# Patient Record
Sex: FEMALE | Race: WHITE | NOT HISPANIC OR LATINO | Employment: OTHER | URBAN - METROPOLITAN AREA
[De-identification: names, ages, dates, MRNs, and addresses within clinical notes are randomized per-mention and may not be internally consistent; named-entity substitution may affect disease eponyms.]

---

## 2017-01-10 ENCOUNTER — TRANSCRIBE ORDERS (OUTPATIENT)
Dept: ADMINISTRATIVE | Facility: HOSPITAL | Age: 82
End: 2017-01-10

## 2017-01-10 ENCOUNTER — APPOINTMENT (OUTPATIENT)
Dept: LAB | Facility: MEDICAL CENTER | Age: 82
End: 2017-01-10
Payer: MEDICARE

## 2017-01-10 DIAGNOSIS — I10 UNSPECIFIED ESSENTIAL HYPERTENSION: ICD-10-CM

## 2017-01-10 DIAGNOSIS — I10 UNSPECIFIED ESSENTIAL HYPERTENSION: Primary | ICD-10-CM

## 2017-01-10 LAB
ANION GAP SERPL CALCULATED.3IONS-SCNC: 10 MMOL/L (ref 4–13)
BUN SERPL-MCNC: 20 MG/DL (ref 5–25)
CALCIUM SERPL-MCNC: 9.4 MG/DL (ref 8.3–10.1)
CHLORIDE SERPL-SCNC: 109 MMOL/L (ref 100–108)
CO2 SERPL-SCNC: 26 MMOL/L (ref 21–32)
CREAT SERPL-MCNC: 0.92 MG/DL (ref 0.6–1.3)
GFR SERPL CREATININE-BSD FRML MDRD: 57 ML/MIN/1.73SQ M
GLUCOSE SERPL-MCNC: 77 MG/DL (ref 65–140)
POTASSIUM SERPL-SCNC: 4.2 MMOL/L (ref 3.5–5.3)
SODIUM SERPL-SCNC: 145 MMOL/L (ref 136–145)

## 2017-01-10 PROCEDURE — 80048 BASIC METABOLIC PNL TOTAL CA: CPT

## 2017-01-10 PROCEDURE — 36415 COLL VENOUS BLD VENIPUNCTURE: CPT

## 2017-04-21 ENCOUNTER — HOSPITAL ENCOUNTER (OUTPATIENT)
Dept: RADIOLOGY | Facility: MEDICAL CENTER | Age: 82
Discharge: HOME | End: 2017-04-21
Payer: MEDICARE

## 2017-04-21 ENCOUNTER — TRANSCRIBE ORDERS (OUTPATIENT)
Dept: ADMINISTRATIVE | Facility: HOSPITAL | Age: 82
End: 2017-04-21

## 2017-04-21 DIAGNOSIS — M47.814 THORACIC SPONDYLOSIS WITHOUT MYELOPATHY: Primary | ICD-10-CM

## 2017-04-21 DIAGNOSIS — M47.814 THORACIC SPONDYLOSIS WITHOUT MYELOPATHY: ICD-10-CM

## 2017-04-21 PROCEDURE — 72072 X-RAY EXAM THORAC SPINE 3VWS: CPT

## 2017-12-04 ENCOUNTER — TRANSCRIBE ORDERS (OUTPATIENT)
Dept: ADMINISTRATIVE | Facility: HOSPITAL | Age: 82
End: 2017-12-04

## 2017-12-04 ENCOUNTER — APPOINTMENT (OUTPATIENT)
Dept: RADIOLOGY | Facility: MEDICAL CENTER | Age: 82
End: 2017-12-04
Payer: MEDICARE

## 2017-12-04 ENCOUNTER — GENERIC CONVERSION - ENCOUNTER (OUTPATIENT)
Dept: OTHER | Facility: OTHER | Age: 82
End: 2017-12-04

## 2017-12-04 DIAGNOSIS — M16.11 PRIMARY OSTEOARTHRITIS OF RIGHT HIP: Primary | ICD-10-CM

## 2017-12-04 PROCEDURE — 71101 X-RAY EXAM UNILAT RIBS/CHEST: CPT

## 2017-12-04 PROCEDURE — 73502 X-RAY EXAM HIP UNI 2-3 VIEWS: CPT

## 2018-03-08 ENCOUNTER — TRANSCRIBE ORDERS (OUTPATIENT)
Dept: PHYSICAL THERAPY | Facility: MEDICAL CENTER | Age: 83
End: 2018-03-08

## 2018-03-08 ENCOUNTER — EVALUATION (OUTPATIENT)
Dept: PHYSICAL THERAPY | Facility: MEDICAL CENTER | Age: 83
End: 2018-03-08
Payer: MEDICARE

## 2018-03-08 DIAGNOSIS — M25.552 LEFT HIP PAIN: Primary | ICD-10-CM

## 2018-03-08 PROCEDURE — G8979 MOBILITY GOAL STATUS: HCPCS | Performed by: PHYSICAL THERAPIST

## 2018-03-08 PROCEDURE — G8978 MOBILITY CURRENT STATUS: HCPCS | Performed by: PHYSICAL THERAPIST

## 2018-03-08 PROCEDURE — 97161 PT EVAL LOW COMPLEX 20 MIN: CPT | Performed by: PHYSICAL THERAPIST

## 2018-03-08 PROCEDURE — 97110 THERAPEUTIC EXERCISES: CPT | Performed by: PHYSICAL THERAPIST

## 2018-03-08 NOTE — LETTER
2018    Ana María Reddy DO  [address] [place] [id number]    Patient: Lawrence Houser   YOB: 1923   Date of Visit: 3/8/2018     Encounter Diagnosis     ICD-10-CM    1  Left hip pain M25 552        Dear Dr Karol Ann:    Please review the attached Plan of Care from [facility] recent visit  Please verify that you agree therapy should continue by signing the attached document and sending it back to our office  If you have any questions or concerns, please don't hesitate to call  Sincerely,    Jose George, PT      Referring Provider:      I certify that I have read the below Plan of Care and certify the need for these services furnished under this plan of treatment while under my care  Ana María Reddy DO  [address] [place] [id number]  VIA Facsimile: [phone]          PT Evaluation     Today's date: 3/8/2018  Patient name: Lawrence Houser  : 1923  MRN: [id number]  Referring provider: Carlos Manuel Ross DO  Dx: No diagnosis found  Assessment  Impairments: abnormal or restricted ROM, impaired physical strength, lacks appropriate home exercise program and pain with function    Assessment details: Lawrence Houser is a 80 y o  female who presents with No diagnosis found     Patient presents alert and oriented with the above impairments  Cristi Lang will benefit from PT to addres deficits in order to maximize and return to prior level of function  No further referral appears necessary at this time based upon examination results  Prognosis is good given HEP compliance  Please contact me if you have any questions or recommendations  Understanding of Dx/Px/POC: good   Prognosis: good    Goals  Short Term Goals:   1  Pain decreased 2 ratings in 4 weeks  2  ROM increased 10* in 4 weeks  3  Strength increased 1/2 grade in 4 weeks    Long Term Goals:   1  ADLS/IADLS in related activities improved to maximal level in 8 weeks  2    Sit to stand is improved to maximal level in 8 weeks  3  Recreational activities are improved to maximal level in 8 weeks  4   [place] with HEP in 8 weeks  Plan  Patient would benefit from: skilled PT  Planned modality interventions: cryotherapy  Planned therapy interventions: manual therapy, flexibility, functional ROM exercises, therapeutic exercise, stretching, strengthening, patient education, neuromuscular re-education and home exercise program  Frequency: 2x week  Duration in weeks: 6  Treatment plan discussed with: patient        Subjective Evaluation    History of Present Illness  Mechanism of injury: Pt reports onset of L sided groin pain a little over a month ago  She spoke w/ PCP regarding pain and was referred to physical therapy  She reports that pain is intermittent in nature and 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  She also reports some pain and difficulty w/ sit to stand transfers  She continues to play organ 3-4x/week for about 1 hour each time  Pain worsens as time playing increases  She denies sleep disturbance  She remains independent w/ all cooking, cleaning, driving      Pain  At best pain ratin  At worst pain ratin    Patient Goals  Patient goals for therapy: decreased pain, return to sport/leisure activities and increased motion          Objective     Active Range of Motion   Left Hip   Flexion: 90 degrees   Extension: -12 degrees   Abduction: 15 degrees     Right Hip   Flexion: 95 degrees   Extension: 10 degrees   Abduction: 45 degrees     Strength/Myotome Testing     Left Hip   Planes of Motion   Flexion: 3+  Abduction: 3+    Right Hip   Planes of Motion   Flexion: 4+  Abduction: 4-          Precautions: hypertension    Daily Treatment Diary     Manual                                                                                   Exercise Diary  3/8            Adductor squeeze 5"x10            Hip abd w/ tband RTB 5"x10            SLR nv            marlynhelmargarito hadley Seated marching nv            Hamstring stretch nv            laq nv                                                                                                                                                                                         Modalities

## 2018-03-08 NOTE — PROGRESS NOTES
PT Evaluation     Today's date: 3/8/2018  Patient name: Alix Mckenna  : 1923  MRN: [id number]  Referring provider: Armida Zamorano DO  Dx: No diagnosis found  Assessment  Impairments: abnormal or restricted ROM, impaired physical strength, lacks appropriate home exercise program and pain with function    Assessment details: Alix Mckenna is a 80 y o  female who presents with No diagnosis found     Patient presents alert and oriented with the above impairments  Angela Gaminoimmer will benefit from PT to addres deficits in order to maximize and return to prior level of function  No further referral appears necessary at this time based upon examination results  Prognosis is good given HEP compliance  Please contact me if you have any questions or recommendations  Understanding of Dx/Px/POC: good   Prognosis: good    Goals  Short Term Goals:   1  Pain decreased 2 ratings in 4 weeks  2  ROM increased 10* in 4 weeks  3  Strength increased 1/2 grade in 4 weeks    Long Term Goals:   1  ADLS/IADLS in related activities improved to maximal level in 8 weeks  2  Sit to stand is improved to maximal level in 8 weeks  3  Recreational activities are improved to maximal level in 8 weeks  4   [place] with HEP in 8 weeks  Plan  Patient would benefit from: skilled PT  Planned modality interventions: cryotherapy  Planned therapy interventions: manual therapy, flexibility, functional ROM exercises, therapeutic exercise, stretching, strengthening, patient education, neuromuscular re-education and home exercise program  Frequency: 2x week  Duration in weeks: 6  Treatment plan discussed with: patient        Subjective Evaluation    History of Present Illness  Mechanism of injury: Pt reports onset of L sided groin pain a little over a month ago  She spoke w/ PCP regarding pain and was referred to physical therapy  She reports that pain is intermittent in nature and 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  She also reports some pain and difficulty w/ sit to stand transfers  She continues to play organ 3-4x/week for about 1 hour each time  Pain worsens as time playing increases  She denies sleep disturbance  She remains independent w/ all cooking, cleaning, driving      Pain  At best pain ratin  At worst pain ratin    Patient Goals  Patient goals for therapy: decreased pain, return to sport/leisure activities and increased motion          Objective     Active Range of Motion   Left Hip   Flexion: 90 degrees   Extension: -12 degrees   Abduction: 15 degrees     Right Hip   Flexion: 95 degrees   Extension: 10 degrees   Abduction: 45 degrees     Strength/Myotome Testing     Left Hip   Planes of Motion   Flexion: 3+  Abduction: 3+    Right Hip   Planes of Motion   Flexion: 4+  Abduction: 4-          Precautions: hypertension    Daily Treatment Diary     Manual                                                                                   Exercise Diary  3/8            Adductor squeeze 5"x10            Hip abd w/ tband RTB 5"x10            SLR nv            clamshells nv            Seated marching nv            Hamstring stretch nv            laq nv                                                                                                                                                                                         Modalities

## 2018-03-22 ENCOUNTER — OFFICE VISIT (OUTPATIENT)
Dept: PHYSICAL THERAPY | Facility: MEDICAL CENTER | Age: 83
End: 2018-03-22
Payer: MEDICARE

## 2018-03-22 DIAGNOSIS — M25.552 LEFT HIP PAIN: Primary | ICD-10-CM

## 2018-03-22 PROCEDURE — 97110 THERAPEUTIC EXERCISES: CPT

## 2018-03-22 PROCEDURE — 97112 NEUROMUSCULAR REEDUCATION: CPT

## 2018-03-22 NOTE — PROGRESS NOTES
Daily Note     Today's date: 3/22/2018  Patient name: Yanely Burn  : 1923  MRN: [id number]  Referring provider: Maria D Thompson DO  Dx:   Encounter Diagnosis     ICD-10-CM    1  Left hip pain M25 552                   Subjective: Pt reports having pain today 3/10 in her left hip front and side  Notes that she is doing her home exercises daily with no further questions  Objective: See treatment diary below    Precautions: hypertension     Daily Treatment Diary      Manual                                                                                                                                                      Exercise Diary  3/8  3/22                   Adductor squeeze 5"x10  5"x20                   Hip abd w/ tband RTB 5"x10  5"x10                   SLR nv  2x10 ea                    clamshells nv  2x10                   Seated marching nv  10x ea                   Hamstring stretch nv  30"x3 ea                    laq nv  2x10 ea                     bike    6'                                                                                                                                                                                                                                                                                                                         Modalities                                                                                                           Assessment: Tolerated treatment fair  Patient demonstrated fatigue post treatment and would benefit from continued PT  Performed all TE on both sides today to promote strength  Pt could not tolerate lying on her L side due to pain  She also had pain with resistive abduction and was instructed to stay in a pain free range  Pt was fatigued at end  Ended on the bike today  Plan: Continue per plan of care

## 2018-03-26 ENCOUNTER — OFFICE VISIT (OUTPATIENT)
Dept: PHYSICAL THERAPY | Facility: MEDICAL CENTER | Age: 83
End: 2018-03-26
Payer: MEDICARE

## 2018-03-26 DIAGNOSIS — M25.552 LEFT HIP PAIN: Primary | ICD-10-CM

## 2018-03-26 PROCEDURE — 97110 THERAPEUTIC EXERCISES: CPT | Performed by: PHYSICAL MEDICINE & REHABILITATION

## 2018-03-26 NOTE — PROGRESS NOTES
Daily Note     Today's date: 3/26/2018  Patient name: Surjit Hitchcock  : 1923  MRN: [id number]  Referring provider: Farzad Latif DO  Dx:   Encounter Diagnosis     ICD-10-CM    1  Left hip pain M25 552                   Subjective: Patient presents with continued complaints of left anterior hip/groin pain  Patient requests HEP  Objective: See treatment diary below    Precautions: hypertension     Daily Treatment Diary      Manual                                                                                                                                                      Exercise Diary  3/8  3/22  3/26                 Adductor squeeze 5"x10  5"x20  NP                 Hip abd w/ tband RTB 5"x10  5"x10  NP                 SLR nv  2x10 ea   NP                 clamshells nv  2x10  seated 20x5" RTB                 Seated marching nv  10x ea  1# 10xea                 Hamstring stretch nv  30"x3 ea   3x20" ea seated                 laq nv  2x10 ea   1#  2x10                  bike    6'  8'                                                                                                                                                                                                                                                                                                                       Modalities                                                                                                           Assessment: Tolerated treatment fair  Instructed patient on updated HEP to include TE as charted  All TE performed seated in chair this session  Intermittent cueing for form maintenance and range limitation to avoid pain provocation throughout  Patient demonstrated fatigue post treatment and would benefit from continued PT  Plan: Continue per plan of care

## 2018-04-02 ENCOUNTER — EVALUATION (OUTPATIENT)
Dept: PHYSICAL THERAPY | Facility: MEDICAL CENTER | Age: 83
End: 2018-04-02
Payer: MEDICARE

## 2018-04-02 DIAGNOSIS — M25.552 LEFT HIP PAIN: Primary | ICD-10-CM

## 2018-04-02 PROCEDURE — G8979 MOBILITY GOAL STATUS: HCPCS | Performed by: PHYSICAL THERAPIST

## 2018-04-02 PROCEDURE — 97110 THERAPEUTIC EXERCISES: CPT | Performed by: PHYSICAL THERAPIST

## 2018-04-02 PROCEDURE — G8980 MOBILITY D/C STATUS: HCPCS | Performed by: PHYSICAL THERAPIST

## 2018-04-02 NOTE — PROGRESS NOTES
PT Re-Evaluation  and PT Discharge    Today's date: 2018  Patient name: Emigdio Cedeño  : 1923  MRN: [id number]  Referring provider: Bhavna Hall DO  Dx:   Encounter Diagnosis     ICD-10-CM    1  Left hip pain M25 552                   Assessment  Impairments: impaired physical strength and pain with function    Assessment details: Emigdio Cedeño has attended 4 visits since initiating PT and has demonstrated overall improvement  Mobility and strength has improved with intermittent decreased reports of pain  Emigdio Cedeño has demonstrated an improvement in function as well  Currently, she has made steady progress towards her goals, but continue to remain limited with organ playing   At this time, patient wishes to be discharged and continue w/ hep independently  She was provided w/ updated hep at last visit and reports confidence w/ all exercises  Advised to f/u w/ MD if pain persists and/or worsens  Goals  Goals  Short Term Goals:   1  Pain decreased 2 ratings in 4 weeks - PARTIALLY MET  2   ROM increased 10* in 4 weeks - MET  3  Strength increased 1/2 grade in 4 weeks - MET    Long Term Goals:   1  ADLS/IADLS in related activities improved to maximal level in 8 weeks - MET  2  Sit to stand is improved to maximal level in 8 weeks - PARTIALLY MET  3  Recreational activities are improved to maximal level in 8 weeks  NOT MET  4  [place] with HEP in 8 weeks  - MET    Plan  Plan details: D/c TODAY        Subjective Evaluation    History of Present Illness  Mechanism of injury: Pt reports persistent groin pain w/ playing organ  Also some pain w/ standing following prolonged sitting  No issues w/ standing and walking  Remains independent w/ all household chores    Pain  At best pain ratin  At worst pain ratin    Patient Goals  Patient goals for therapy: decreased pain          Objective     Active Range of Motion   Left Hip   Flexion: 108 degrees   Extension: 3 degrees Abduction: 25 degrees     Strength/Myotome Testing     Left Hip   Planes of Motion   Flexion: 4-  Abduction: 4-      Flowsheet Rows    Flowsheet Row Most Recent Value   PT/OT G-Codes   Current Score  56   Projected Score  61   FOTO information reviewed  Yes   Assessment Type  Discharge   G code set  Mobility: Walking & Moving Around   Mobility: Walking and Moving Around Goal Status ()  CK   Mobility: Walking and Moving Around Discharge Status ()  CK          Precautions: hypertension    Daily Treatment Diary     Manual                                                                                   Exercise Diary  4/2            bike 10'            LAQ 1# 5"x20            Seated marching 1# 5"x20            SLR x20            Hip adductor squeezes 5"x20            Seated hamstring stretch 30"x3                                                                                                                                                                                                      Modalities

## 2018-04-02 NOTE — LETTER
2018    Jennifer Andreas   [address] [place] [address]    Patient: Marlea Sacks   YOB: 1923   Date of Visit: 2018     Encounter Diagnosis     ICD-10-CM    1  Left hip pain M25 552        Dear Dr Don Montanez:    Please review the attached Plan of Care from [facility] recent visit  Please verify that you agree therapy should continue by signing the attached document and sending it back to our office  If you have any questions or concerns, please don't hesitate to call  Sincerely,    Campbell Nick, PT      Referring Provider:      I certify that I have read the below Plan of Care and certify the need for these services furnished under this plan of treatment while under my care  Jennifer DO Andreas  [address]  [facility] [address]  VIA Facsimile: [phone]          PT Re-Evaluation  and PT Discharge    Today's date: 2018  Patient name: Marlea Sacks  : 1923  MRN: [id number]  Referring provider: Earlean Duane, DO  Dx:   Encounter Diagnosis     ICD-10-CM    1  Left hip pain M25 552                   Assessment  Impairments: impaired physical strength and pain with function    Assessment details: Marlea Sacks has attended 4 visits since initiating PT and has demonstrated overall improvement  Mobility and strength has improved with intermittent decreased reports of pain  Marlea Sacks has demonstrated an improvement in function as well  Currently, she has made steady progress towards her goals, but continue to remain limited with organ playing   At this time, patient wishes to be discharged and continue w/ hep independently  She was provided w/ updated hep at last visit and reports confidence w/ all exercises  Advised to f/u w/ MD if pain persists and/or worsens  Goals  Goals  Short Term Goals:   1  Pain decreased 2 ratings in 4 weeks - PARTIALLY MET  2   ROM increased 10* in 4 weeks - MET  3    Strength increased 1/2 grade in 4 weeks - MET    Long Term Goals:   1  ADLS/IADLS in related activities improved to maximal level in 8 weeks - MET  2  Sit to stand is improved to maximal level in 8 weeks - PARTIALLY MET  3  Recreational activities are improved to maximal level in 8 weeks  NOT MET  4  [place] with HEP in 8 weeks  - MET    Plan  Plan details: D/c TODAY        Subjective Evaluation    History of Present Illness  Mechanism of injury: Pt reports persistent groin pain w/ playing organ  Also some pain w/ standing following prolonged sitting  No issues w/ standing and walking  Remains independent w/ all household chores    Pain  At best pain ratin  At worst pain ratin    Patient Goals  Patient goals for therapy: decreased pain          Objective     Active Range of Motion   Left Hip   Flexion: 108 degrees   Extension: 3 degrees   Abduction: 25 degrees     Strength/Myotome Testing     Left Hip   Planes of Motion   Flexion: 4-  Abduction: 4-      Flowsheet Rows    Flowsheet Row Most Recent Value   PT/OT G-Codes   Current Score  56   Projected Score  59   FOTO information reviewed  Yes   Assessment Type  Discharge   G code set  Mobility: Walking & Moving Around   Mobility: Walking and Moving Around Goal Status ()  CK   Mobility: Walking and Moving Around Discharge Status ()  CK          Precautions: hypertension    Daily Treatment Diary     Manual                                                                                   Exercise Diary  /            bike 10'            LAQ 1# 5"x20            Seated marching 1# 5"x20            SLR x20            Hip adductor squeezes 5"x20            Seated hamstring stretch 30"x3                                                                                                                                                                                                      Modalities

## 2018-04-13 ENCOUNTER — TRANSCRIBE ORDERS (OUTPATIENT)
Dept: ADMINISTRATIVE | Facility: HOSPITAL | Age: 83
End: 2018-04-13

## 2018-04-13 DIAGNOSIS — I74.9 EMBOLISM (HCC): Primary | ICD-10-CM

## 2018-04-20 ENCOUNTER — TRANSCRIBE ORDERS (OUTPATIENT)
Dept: ADMINISTRATIVE | Facility: HOSPITAL | Age: 83
End: 2018-04-20

## 2018-04-20 ENCOUNTER — APPOINTMENT (OUTPATIENT)
Dept: LAB | Facility: MEDICAL CENTER | Age: 83
End: 2018-04-20
Payer: MEDICARE

## 2018-04-20 ENCOUNTER — HOSPITAL ENCOUNTER (OUTPATIENT)
Dept: RADIOLOGY | Facility: MEDICAL CENTER | Age: 83
Discharge: HOME | End: 2018-04-20
Payer: MEDICARE

## 2018-04-20 DIAGNOSIS — I74.8 HEPATIC ARTERY THROMBOSIS (HCC): ICD-10-CM

## 2018-04-20 DIAGNOSIS — E78.5 HYPERLIPIDEMIA, UNSPECIFIED HYPERLIPIDEMIA TYPE: ICD-10-CM

## 2018-04-20 DIAGNOSIS — I74.9 EMBOLISM (HCC): ICD-10-CM

## 2018-04-20 DIAGNOSIS — I10 ESSENTIAL HYPERTENSION, BENIGN: Primary | ICD-10-CM

## 2018-04-20 LAB
ALBUMIN SERPL BCP-MCNC: 3.3 G/DL (ref 3.5–5)
ALP SERPL-CCNC: 88 U/L (ref 46–116)
ALT SERPL W P-5'-P-CCNC: 20 U/L (ref 12–78)
ANION GAP SERPL CALCULATED.3IONS-SCNC: 7 MMOL/L (ref 4–13)
AST SERPL W P-5'-P-CCNC: 29 U/L (ref 5–45)
BASOPHILS # BLD AUTO: 0.03 THOUSANDS/ΜL (ref 0–0.1)
BASOPHILS NFR BLD AUTO: 1 % (ref 0–1)
BILIRUB SERPL-MCNC: 0.74 MG/DL (ref 0.2–1)
BUN SERPL-MCNC: 28 MG/DL (ref 5–25)
CALCIUM SERPL-MCNC: 9 MG/DL (ref 8.3–10.1)
CHLORIDE SERPL-SCNC: 111 MMOL/L (ref 100–108)
CHOLEST SERPL-MCNC: 122 MG/DL (ref 50–200)
CO2 SERPL-SCNC: 27 MMOL/L (ref 21–32)
CREAT SERPL-MCNC: 1.03 MG/DL (ref 0.6–1.3)
EOSINOPHIL # BLD AUTO: 0.18 THOUSAND/ΜL (ref 0–0.61)
EOSINOPHIL NFR BLD AUTO: 4 % (ref 0–6)
ERYTHROCYTE [DISTWIDTH] IN BLOOD BY AUTOMATED COUNT: 13.8 % (ref 11.6–15.1)
GFR SERPL CREATININE-BSD FRML MDRD: 47 ML/MIN/1.73SQ M
GLUCOSE P FAST SERPL-MCNC: 92 MG/DL (ref 65–99)
HCT VFR BLD AUTO: 42.2 % (ref 34.8–46.1)
HDLC SERPL-MCNC: 34 MG/DL (ref 40–60)
HGB BLD-MCNC: 14.2 G/DL (ref 11.5–15.4)
LDLC SERPL CALC-MCNC: 65 MG/DL (ref 0–100)
LYMPHOCYTES # BLD AUTO: 1.84 THOUSANDS/ΜL (ref 0.6–4.47)
LYMPHOCYTES NFR BLD AUTO: 38 % (ref 14–44)
MCH RBC QN AUTO: 30 PG (ref 26.8–34.3)
MCHC RBC AUTO-ENTMCNC: 33.6 G/DL (ref 31.4–37.4)
MCV RBC AUTO: 89 FL (ref 82–98)
MONOCYTES # BLD AUTO: 0.78 THOUSAND/ΜL (ref 0.17–1.22)
MONOCYTES NFR BLD AUTO: 16 % (ref 4–12)
NEUTROPHILS # BLD AUTO: 2.06 THOUSANDS/ΜL (ref 1.85–7.62)
NEUTS SEG NFR BLD AUTO: 41 % (ref 43–75)
NONHDLC SERPL-MCNC: 88 MG/DL
NRBC BLD AUTO-RTO: 0 /100 WBCS
PLATELET # BLD AUTO: 143 THOUSANDS/UL (ref 149–390)
PMV BLD AUTO: 11.7 FL (ref 8.9–12.7)
POTASSIUM SERPL-SCNC: 3.9 MMOL/L (ref 3.5–5.3)
PROT SERPL-MCNC: 6.5 G/DL (ref 6.4–8.2)
RBC # BLD AUTO: 4.74 MILLION/UL (ref 3.81–5.12)
SODIUM SERPL-SCNC: 145 MMOL/L (ref 136–145)
TRIGL SERPL-MCNC: 113 MG/DL
TSH SERPL DL<=0.05 MIU/L-ACNC: 3.1 UIU/ML (ref 0.36–3.74)
WBC # BLD AUTO: 4.9 THOUSAND/UL (ref 4.31–10.16)

## 2018-04-20 PROCEDURE — 36415 COLL VENOUS BLD VENIPUNCTURE: CPT | Performed by: FAMILY MEDICINE

## 2018-04-20 PROCEDURE — 93880 EXTRACRANIAL BILAT STUDY: CPT

## 2018-04-20 PROCEDURE — 84443 ASSAY THYROID STIM HORMONE: CPT | Performed by: FAMILY MEDICINE

## 2018-04-20 PROCEDURE — 80061 LIPID PANEL: CPT | Performed by: FAMILY MEDICINE

## 2018-04-20 PROCEDURE — 93880 EXTRACRANIAL BILAT STUDY: CPT | Performed by: SURGERY

## 2018-04-20 PROCEDURE — 80053 COMPREHEN METABOLIC PANEL: CPT | Performed by: FAMILY MEDICINE

## 2018-04-20 PROCEDURE — 85025 COMPLETE CBC W/AUTO DIFF WBC: CPT | Performed by: FAMILY MEDICINE

## 2018-05-04 ENCOUNTER — APPOINTMENT (OUTPATIENT)
Dept: RADIOLOGY | Facility: MEDICAL CENTER | Age: 83
End: 2018-05-04
Payer: MEDICARE

## 2018-05-04 ENCOUNTER — OFFICE VISIT (OUTPATIENT)
Dept: OBGYN CLINIC | Facility: MEDICAL CENTER | Age: 83
End: 2018-05-04
Payer: MEDICARE

## 2018-05-04 VITALS — DIASTOLIC BLOOD PRESSURE: 70 MMHG | HEART RATE: 79 BPM | SYSTOLIC BLOOD PRESSURE: 137 MMHG

## 2018-05-04 DIAGNOSIS — M25.552 LEFT HIP PAIN: ICD-10-CM

## 2018-05-04 DIAGNOSIS — M25.552 PAIN IN LEFT HIP: ICD-10-CM

## 2018-05-04 DIAGNOSIS — M70.62 TROCHANTERIC BURSITIS OF LEFT HIP: ICD-10-CM

## 2018-05-04 DIAGNOSIS — M25.552 PAIN IN LEFT HIP: Primary | ICD-10-CM

## 2018-05-04 PROCEDURE — 20610 DRAIN/INJ JOINT/BURSA W/O US: CPT | Performed by: ORTHOPAEDIC SURGERY

## 2018-05-04 PROCEDURE — 99203 OFFICE O/P NEW LOW 30 MIN: CPT | Performed by: ORTHOPAEDIC SURGERY

## 2018-05-04 PROCEDURE — 73502 X-RAY EXAM HIP UNI 2-3 VIEWS: CPT

## 2018-05-04 RX ORDER — MELOXICAM 7.5 MG/1
7.5 TABLET ORAL DAILY
Refills: 5 | COMMUNITY
Start: 2018-04-02 | End: 2020-02-01 | Stop reason: HOSPADM

## 2018-05-04 RX ORDER — TRAVOPROST 0.004 %
DROPS OPHTHALMIC (EYE)
Refills: 1 | COMMUNITY
Start: 2018-04-28

## 2018-05-04 RX ORDER — ACETAMINOPHEN/DIPHENHYDRAMINE 500MG-25MG
1 TABLET ORAL DAILY
COMMUNITY

## 2018-05-04 RX ORDER — LIDOCAINE HYDROCHLORIDE 10 MG/ML
2 INJECTION, SOLUTION INFILTRATION; PERINEURAL
Status: COMPLETED | OUTPATIENT
Start: 2018-05-04 | End: 2018-05-04

## 2018-05-04 RX ORDER — AMLODIPINE BESYLATE 10 MG/1
10 TABLET ORAL DAILY
COMMUNITY
End: 2018-09-07

## 2018-05-04 RX ORDER — AMOXICILLIN 500 MG/1
500 CAPSULE ORAL 3 TIMES DAILY
Refills: 0 | COMMUNITY
Start: 2018-04-17 | End: 2018-09-07

## 2018-05-04 RX ORDER — OMEPRAZOLE 20 MG/1
20 CAPSULE, DELAYED RELEASE ORAL DAILY
Refills: 3 | COMMUNITY
Start: 2018-02-01

## 2018-05-04 RX ORDER — BETAMETHASONE SODIUM PHOSPHATE AND BETAMETHASONE ACETATE 3; 3 MG/ML; MG/ML
12 INJECTION, SUSPENSION INTRA-ARTICULAR; INTRALESIONAL; INTRAMUSCULAR; SOFT TISSUE
Status: COMPLETED | OUTPATIENT
Start: 2018-05-04 | End: 2018-05-04

## 2018-05-04 RX ORDER — BUPIVACAINE HYDROCHLORIDE 2.5 MG/ML
2 INJECTION, SOLUTION INFILTRATION; PERINEURAL
Status: COMPLETED | OUTPATIENT
Start: 2018-05-04 | End: 2018-05-04

## 2018-05-04 RX ADMIN — BUPIVACAINE HYDROCHLORIDE 2 ML: 2.5 INJECTION, SOLUTION INFILTRATION; PERINEURAL at 13:53

## 2018-05-04 RX ADMIN — LIDOCAINE HYDROCHLORIDE 2 ML: 10 INJECTION, SOLUTION INFILTRATION; PERINEURAL at 13:53

## 2018-05-04 RX ADMIN — BETAMETHASONE SODIUM PHOSPHATE AND BETAMETHASONE ACETATE 12 MG: 3; 3 INJECTION, SUSPENSION INTRA-ARTICULAR; INTRALESIONAL; INTRAMUSCULAR; SOFT TISSUE at 13:53

## 2018-05-04 NOTE — PROGRESS NOTES
80 y o female who is a  presents for evaluation of pain left hip and groin, and lateral left hip pain  Occurs without trauma, occurs without back pain, occurs without paresthesias  Review of Systems  Review of systems negative unless otherwise specified in HPI    Past Medical History  Past Medical History:   Diagnosis Date    Hypertension        Past Surgical History  Past Surgical History:   Procedure Laterality Date    HYSTERECTOMY         Current Medications  No current outpatient prescriptions on file prior to visit  No current facility-administered medications on file prior to visit  Recent Labs [facility])    0  Lab Value Date/Time   HCT 42 2 04/20/2018 0826   HCT 44 7 08/25/2015 0837   HGB 14 2 04/20/2018 0826   HGB 15 6 (H) 08/25/2015 0837   WBC 4 90 04/20/2018 0826   WBC 5 98 08/25/2015 0837   GLUCOSE 77 01/10/2017 1103   GLUCOSE 95 08/25/2015 0837         Physical exam  · General: Awake, Alert, Oriented  · Eyes: Pupils equal, round and reactive to light  · Heart: regular rate and rhythm  · Lungs: No audible wheezing  · Abdomen: soft  Left hip has restriction of internal rotation, this causes very little pain in left groin  There is tenderness palpation of the left trochanteric flare  This occurs an area devoid of erythema or ecchymotic standing  There is very little left thigh atrophy  There is no tenderness in the left calf  The foot and toes strongly dorsiflex on the left side  Imaging  X-rays left hip show modest arthritic change of the left hip  There are no unexplained lesion was overlying the trochanteric flare    Procedure  Injection of corticosteroid is provided to the left hip overlying the trochanteric flare    It is documented below    Large joint arthrocentesis  Date/Time: 5/4/2018 1:53 PM  Consent given by: patient  Supporting Documentation  Indications: pain   Procedure Details  Location: hip - L greater trochanteric bursa  Preparation: Patient was prepped and draped in the usual sterile fashion  Needle size: 20 G  Approach: lateral  Medications administered: 2 mL bupivacaine 0 25 %; 2 mL lidocaine 1 %; 12 mg betamethasone acetate-betamethasone sodium phosphate 6 (3-3) mg/mL    Patient tolerance: patient tolerated the procedure well with no immediate complications  Dressing:  Sterile dressing applied          Assessment/Plan:   80 y  o female who despite radiographic and clinical evidence of osteoarthritis left hip, has primarily trochanteric bursal symptoms today  On the basis of this, injection of corticosteroid is indicated  It is advised, except, administers outlined above  I would welcome the opportunity see back in 3 months time for follow-up

## 2018-09-07 ENCOUNTER — OFFICE VISIT (OUTPATIENT)
Dept: OBGYN CLINIC | Facility: MEDICAL CENTER | Age: 83
End: 2018-09-07
Payer: MEDICARE

## 2018-09-07 VITALS
SYSTOLIC BLOOD PRESSURE: 127 MMHG | HEART RATE: 77 BPM | WEIGHT: 162.4 LBS | HEIGHT: 59 IN | BODY MASS INDEX: 32.74 KG/M2 | DIASTOLIC BLOOD PRESSURE: 76 MMHG

## 2018-09-07 DIAGNOSIS — M16.12 PRIMARY OSTEOARTHRITIS OF LEFT HIP: ICD-10-CM

## 2018-09-07 DIAGNOSIS — M70.62 TROCHANTERIC BURSITIS OF LEFT HIP: Primary | ICD-10-CM

## 2018-09-07 DIAGNOSIS — M25.552 LEFT HIP PAIN: ICD-10-CM

## 2018-09-07 PROCEDURE — 20610 DRAIN/INJ JOINT/BURSA W/O US: CPT | Performed by: ORTHOPAEDIC SURGERY

## 2018-09-07 PROCEDURE — 99213 OFFICE O/P EST LOW 20 MIN: CPT | Performed by: ORTHOPAEDIC SURGERY

## 2018-09-07 RX ORDER — AMLODIPINE BESYLATE 5 MG/1
5 TABLET ORAL DAILY
Refills: 3 | COMMUNITY
Start: 2018-07-11 | End: 2020-02-01 | Stop reason: HOSPADM

## 2018-09-07 RX ORDER — BETAMETHASONE SODIUM PHOSPHATE AND BETAMETHASONE ACETATE 3; 3 MG/ML; MG/ML
12 INJECTION, SUSPENSION INTRA-ARTICULAR; INTRALESIONAL; INTRAMUSCULAR; SOFT TISSUE
Status: COMPLETED | OUTPATIENT
Start: 2018-09-07 | End: 2018-09-07

## 2018-09-07 RX ORDER — LISINOPRIL 5 MG/1
5 TABLET ORAL DAILY
Refills: 3 | COMMUNITY
Start: 2018-08-22

## 2018-09-07 RX ORDER — BUPIVACAINE HYDROCHLORIDE 2.5 MG/ML
2 INJECTION, SOLUTION INFILTRATION; PERINEURAL
Status: COMPLETED | OUTPATIENT
Start: 2018-09-07 | End: 2018-09-07

## 2018-09-07 RX ORDER — LIDOCAINE HYDROCHLORIDE 10 MG/ML
2 INJECTION, SOLUTION INFILTRATION; PERINEURAL
Status: COMPLETED | OUTPATIENT
Start: 2018-09-07 | End: 2018-09-07

## 2018-09-07 RX ADMIN — BETAMETHASONE SODIUM PHOSPHATE AND BETAMETHASONE ACETATE 12 MG: 3; 3 INJECTION, SUSPENSION INTRA-ARTICULAR; INTRALESIONAL; INTRAMUSCULAR; SOFT TISSUE at 16:02

## 2018-09-07 RX ADMIN — LIDOCAINE HYDROCHLORIDE 2 ML: 10 INJECTION, SOLUTION INFILTRATION; PERINEURAL at 16:02

## 2018-09-07 RX ADMIN — BUPIVACAINE HYDROCHLORIDE 2 ML: 2.5 INJECTION, SOLUTION INFILTRATION; PERINEURAL at 16:02

## 2018-09-07 NOTE — PROGRESS NOTES
Assessment:  1  Trochanteric bursitis of left hip  Large joint arthrocentesis   2  Left hip pain  Large joint arthrocentesis    FL injection left hip (non arthrogram)   3  Primary osteoarthritis of left hip  FL injection left hip (non arthrogram)       Plan:  Left hip trochanteric bursitis that has previously responded well to a cortisone injection which was repeated today  ICE and post-injection protocol advised  WBAT  Activities as tolerated  Advised that she discusses her meloxicam usage with her PCP  Will have her schedule a left hip intra-articular cortisone injection done by radiology    To do next visit:  Return in about 3 months (around 12/7/2018) for re-check  Scribe Attestation    I,:   Juancho Lockwood am acting as a scribe while in the presence of the attending physician :        I,:   Ronald Ramirez MD personally performed the services described in this documentation    as scribed in my presence :              Subjective:   Peggy Echavarria is a 80 y o  female who presents for re-evaluation of her lateral left hip pain secondarily to groin pain  She had great relief from a trochanteric injection performed 4 months ago  She does take 7 5mg mobic in which she thinks she takes once a day  No numbness or tingling  She does have reproducible groin pain getting in/out of a car or bed  Review of systems negative unless otherwise specified in HPI    Past Medical History:   Diagnosis Date    Hypertension        Past Surgical History:   Procedure Laterality Date    HYSTERECTOMY         Family History   Problem Relation Age of Onset    Stroke Mother     Cancer Father        Social History     Occupational History    Not on file       Social History Main Topics    Smoking status: Never Smoker    Smokeless tobacco: Never Used    Alcohol use No    Drug use: No    Sexual activity: Not on file         Current Outpatient Prescriptions:     amLODIPine (NORVASC) 5 mg tablet, Take 5 mg by mouth daily, Disp: , Rfl: 3    Calcium Citrate (CITRACAL PO), Take by mouth, Disp: , Rfl:     Glucos-Chond-Sterol-Fish Oil (GLUCOSAMINE CHONDROITIN PLUS) CAPS, Take 1 capsule by mouth daily, Disp: , Rfl:     lisinopril (ZESTRIL) 5 mg tablet, Take 5 mg by mouth daily, Disp: , Rfl: 3    meloxicam (MOBIC) 7 5 mg tablet, Take 7 5 mg by mouth daily, Disp: , Rfl: 5    omeprazole (PriLOSEC) 20 mg delayed release capsule, Take 20 mg by mouth daily, Disp: , Rfl: 3    TRAVATAN Z 0 004 % ophthalmic solution, PUT 1 DROP INTO BOTH EYES AT BEDTIME, Disp: , Rfl: 1    Allergies   Allergen Reactions    Ampicillin             Vitals:    09/07/18 1539   BP: 127/76   Pulse: 77       Objective:          Physical Exam                    Left Hip Exam     Tenderness   The patient is experiencing tenderness in the greater trochanter      Range of Motion   Flexion: 100 (with groin pain)   Internal Rotation: 20 (with groin pain)   External Rotation: 20 (with groin pain)     Other   Erythema: absent  Sensation: normal            Diagnostics, reviewed and taken today if performed as documented:    None performed        Procedures, if performed today:  Large joint arthrocentesis  Date/Time: 9/7/2018 4:02 PM  Consent given by: patient  Site marked: site marked  Supporting Documentation  Indications: pain   Procedure Details  Location: hip - L greater trochanteric bursa  Preparation: Patient was prepped and draped in the usual sterile fashion  Needle size: 22 G  Ultrasound guidance: no  Approach: lateral  Medications administered: 2 mL bupivacaine 0 25 %; 2 mL lidocaine 1 %; 12 mg betamethasone acetate-betamethasone sodium phosphate 6 (3-3) mg/mL    Patient tolerance: patient tolerated the procedure well with no immediate complications  Dressing:  Sterile dressing applied

## 2018-09-11 ENCOUNTER — HOSPITAL ENCOUNTER (OUTPATIENT)
Dept: RADIOLOGY | Facility: HOSPITAL | Age: 83
Discharge: HOME | End: 2018-09-11
Attending: ORTHOPAEDIC SURGERY
Payer: MEDICARE

## 2018-09-11 ENCOUNTER — TRANSCRIBE ORDERS (OUTPATIENT)
Dept: RADIOLOGY | Facility: HOSPITAL | Age: 83
End: 2018-09-11

## 2018-09-11 DIAGNOSIS — M25.552 LEFT HIP PAIN: ICD-10-CM

## 2018-09-11 DIAGNOSIS — M16.12 PRIMARY OSTEOARTHRITIS OF LEFT HIP: ICD-10-CM

## 2018-09-11 PROCEDURE — 77002 NEEDLE LOCALIZATION BY XRAY: CPT

## 2018-09-11 PROCEDURE — 20610 DRAIN/INJ JOINT/BURSA W/O US: CPT

## 2018-09-11 RX ORDER — METHYLPREDNISOLONE ACETATE 80 MG/ML
80 INJECTION, SUSPENSION INTRA-ARTICULAR; INTRALESIONAL; INTRAMUSCULAR; SOFT TISSUE
Status: COMPLETED | OUTPATIENT
Start: 2018-09-11 | End: 2018-09-11

## 2018-09-11 RX ORDER — BUPIVACAINE HYDROCHLORIDE 2.5 MG/ML
30 INJECTION, SOLUTION EPIDURAL; INFILTRATION; INTRACAUDAL
Status: COMPLETED | OUTPATIENT
Start: 2018-09-11 | End: 2018-09-11

## 2018-09-11 RX ORDER — LIDOCAINE HYDROCHLORIDE 10 MG/ML
5 INJECTION, SOLUTION INFILTRATION; PERINEURAL
Status: COMPLETED | OUTPATIENT
Start: 2018-09-11 | End: 2018-09-11

## 2018-09-11 RX ADMIN — METHYLPREDNISOLONE ACETATE 80 MG: 80 INJECTION, SUSPENSION INTRA-ARTICULAR; INTRALESIONAL; INTRAMUSCULAR; SOFT TISSUE at 13:30

## 2018-09-11 RX ADMIN — BUPIVACAINE HYDROCHLORIDE 2 ML: 2.5 INJECTION, SOLUTION EPIDURAL; INFILTRATION; INTRACAUDAL at 13:30

## 2018-09-11 RX ADMIN — IOHEXOL 3 ML: 300 INJECTION, SOLUTION INTRAVENOUS at 13:30

## 2018-09-11 RX ADMIN — LIDOCAINE HYDROCHLORIDE 5 ML: 10 INJECTION, SOLUTION INFILTRATION; PERINEURAL at 13:30

## 2018-12-07 ENCOUNTER — OFFICE VISIT (OUTPATIENT)
Dept: OBGYN CLINIC | Facility: MEDICAL CENTER | Age: 83
End: 2018-12-07
Payer: MEDICARE

## 2018-12-07 VITALS
WEIGHT: 159 LBS | SYSTOLIC BLOOD PRESSURE: 170 MMHG | DIASTOLIC BLOOD PRESSURE: 69 MMHG | BODY MASS INDEX: 33.37 KG/M2 | RESPIRATION RATE: 20 BRPM | HEART RATE: 73 BPM | HEIGHT: 58 IN

## 2018-12-07 DIAGNOSIS — M16.12 PRIMARY OSTEOARTHRITIS OF ONE HIP, LEFT: Primary | ICD-10-CM

## 2018-12-07 DIAGNOSIS — M70.61 TROCHANTERIC BURSITIS OF BOTH HIPS: ICD-10-CM

## 2018-12-07 DIAGNOSIS — M25.552 LEFT HIP PAIN: ICD-10-CM

## 2018-12-07 DIAGNOSIS — M70.62 TROCHANTERIC BURSITIS OF BOTH HIPS: ICD-10-CM

## 2018-12-07 DIAGNOSIS — M25.551 RIGHT HIP PAIN: ICD-10-CM

## 2018-12-07 PROCEDURE — 20610 DRAIN/INJ JOINT/BURSA W/O US: CPT | Performed by: ORTHOPAEDIC SURGERY

## 2018-12-07 PROCEDURE — 99213 OFFICE O/P EST LOW 20 MIN: CPT | Performed by: ORTHOPAEDIC SURGERY

## 2018-12-07 RX ORDER — LIDOCAINE HYDROCHLORIDE 10 MG/ML
2 INJECTION, SOLUTION INFILTRATION; PERINEURAL
Status: COMPLETED | OUTPATIENT
Start: 2018-12-07 | End: 2018-12-07

## 2018-12-07 RX ORDER — BUPIVACAINE HYDROCHLORIDE 2.5 MG/ML
2 INJECTION, SOLUTION INFILTRATION; PERINEURAL
Status: COMPLETED | OUTPATIENT
Start: 2018-12-07 | End: 2018-12-07

## 2018-12-07 RX ORDER — BETAMETHASONE SODIUM PHOSPHATE AND BETAMETHASONE ACETATE 3; 3 MG/ML; MG/ML
12 INJECTION, SUSPENSION INTRA-ARTICULAR; INTRALESIONAL; INTRAMUSCULAR; SOFT TISSUE
Status: COMPLETED | OUTPATIENT
Start: 2018-12-07 | End: 2018-12-07

## 2018-12-07 RX ADMIN — BUPIVACAINE HYDROCHLORIDE 2 ML: 2.5 INJECTION, SOLUTION INFILTRATION; PERINEURAL at 15:13

## 2018-12-07 RX ADMIN — LIDOCAINE HYDROCHLORIDE 2 ML: 10 INJECTION, SOLUTION INFILTRATION; PERINEURAL at 15:13

## 2018-12-07 RX ADMIN — BETAMETHASONE SODIUM PHOSPHATE AND BETAMETHASONE ACETATE 12 MG: 3; 3 INJECTION, SUSPENSION INTRA-ARTICULAR; INTRALESIONAL; INTRAMUSCULAR; SOFT TISSUE at 15:13

## 2018-12-07 NOTE — PROGRESS NOTES
Orthopedics   Sandie Scott 80 y o  female MRN: [id number]    Chief Complaint:   bilateral hip pain    HPI:   80 y  o female returns to the clinic 2 months after her left greater trochanter bursa and intra-articular hip injections  She notes that these injections both lasted for 2 months and have returned in the last month with stabbing pins and needles, especially in the anterior groin  She also now and does lateralize to right hip pain without radiation that has just started in the last few days  No history of trauma or twisting injury to either hip since the last visit  Review Of Systems:   Review of Systems   Constitutional: Positive for activity change  HENT: Negative  Eyes: Negative  Respiratory: Negative  Cardiovascular: Negative  Gastrointestinal: Negative  Endocrine: Negative  Genitourinary: Negative  Musculoskeletal: Positive for arthralgias and myalgias  Skin: Negative  Allergic/Immunologic: Negative  Neurological: Negative  Hematological: Negative  Psychiatric/Behavioral: Negative  Past Medical History:   Past Medical History:   Diagnosis Date    Hypertension        Past Surgical History:   Past Surgical History:   Procedure Laterality Date    FL INJECTION LEFT HIP (NON ARTHROGRAM)  9/11/2018    HYSTERECTOMY         Family History:  Family history reviewed and non-contributory  Family History   Problem Relation Age of Onset    Stroke Mother     Cancer Father        Social History:  Social History     Social History    Marital status:      Spouse name: N/A    Number of children: N/A    Years of education: N/A     Social History Main Topics    Smoking status: Never Smoker    Smokeless tobacco: Never Used    Alcohol use No    Drug use: No    Sexual activity: Not Asked     Other Topics Concern    None     Social History Narrative    None       Allergies:    Allergies   Allergen Reactions    Ampicillin        Labs:    0  Lab Value Date/Time HCT 42 2 04/20/2018 0826   HCT 44 3 04/28/2016 0737   HCT 44 7 08/25/2015 0837   HCT 45 7 07/30/2014 0732   HGB 14 2 04/20/2018 0826   HGB 14 8 04/28/2016 0737   HGB 15 6 (H) 08/25/2015 0837   HGB 15 3 07/30/2014 0732   WBC 4 90 04/20/2018 0826   WBC 7 10 04/28/2016 0737   WBC 5 98 08/25/2015 0837   WBC 6 25 07/30/2014 0732       Meds:    Current Outpatient Prescriptions:     amLODIPine (NORVASC) 5 mg tablet, Take 5 mg by mouth daily, Disp: , Rfl: 3    Calcium Citrate (CITRACAL PO), Take by mouth, Disp: , Rfl:     Glucos-Chond-Sterol-Fish Oil (GLUCOSAMINE CHONDROITIN PLUS) CAPS, Take 1 capsule by mouth daily, Disp: , Rfl:     lisinopril (ZESTRIL) 5 mg tablet, Take 5 mg by mouth daily, Disp: , Rfl: 3    meloxicam (MOBIC) 7 5 mg tablet, Take 7 5 mg by mouth daily, Disp: , Rfl: 5    omeprazole (PriLOSEC) 20 mg delayed release capsule, Take 20 mg by mouth daily, Disp: , Rfl: 3    TRAVATAN Z 0 004 % ophthalmic solution, PUT 1 DROP INTO BOTH EYES AT BEDTIME, Disp: , Rfl: 1    Physical Exam:   /69   Pulse 73   Resp 20   Ht 4' 10" (1 473 m)   Wt 72 1 kg (159 lb)   BMI 33 23 kg/m²   Gen: Alert and oriented to person, place, time  HEENT: EOMI, eyes clear, moist mucus membranes, hearing intact  Respiratory: Bilateral chest rise  No audible wheezing found  Cardiovascular: No audible murmurs  Abdomen: soft  Musculoskeletal: bilateral lower extremity  · Skin pink dry and intact, no erythema  · Painful range of motion of hip joint  · Tender to palpation bilaterally over greater trochanters  · Negative SUNSHINE, FADIR, Stinchfield, and log rolls bilaterally  ·   Radiology:   I personally reviewed the films  Assessment:  89 y  o female with bilateral hip greater trochanteric bursitis and left hip osteoarthritis    Plan:   Weight-bearing as tolerated bilateral lower extremities  Bilateral greater trochanteric bursae were injected today  Patient tolerated the procedure well    We will schedule a intra-articular left hip injection for the patient at her earliest convenience  Will follow up the patient in 3 months for repeat evaluation  Continue home exercise and motion  Patient understands and agrees with treatment plan    Large joint arthrocentesis  Date/Time: 12/7/2018 3:13 PM  Consent given by: patient  Site marked: site marked  Timeout: Immediately prior to procedure a time out was called to verify the correct patient, procedure, equipment, support staff and site/side marked as required   Supporting Documentation  Indications: pain and diagnostic evaluation   Procedure Details  Location: hip - Hip joint: Bilateral greater trochanteric bursa    Preparation: Patient was prepped and draped in the usual sterile fashion  Needle size: 22 G  Ultrasound guidance: no  Medications administered: 2 mL bupivacaine 0 25 %; 2 mL lidocaine 1 %; 12 mg betamethasone acetate-betamethasone sodium phosphate 6 (3-3) mg/mL    Patient tolerance: patient tolerated the procedure well with no immediate complications  Dressing:  Sterile dressing applied      Felicita Salazar MD

## 2018-12-21 ENCOUNTER — HOSPITAL ENCOUNTER (OUTPATIENT)
Dept: RADIOLOGY | Facility: HOSPITAL | Age: 83
Discharge: HOME | End: 2018-12-21
Admitting: RADIOLOGY
Payer: MEDICARE

## 2018-12-21 ENCOUNTER — TRANSCRIBE ORDERS (OUTPATIENT)
Dept: RADIOLOGY | Facility: HOSPITAL | Age: 83
End: 2018-12-21

## 2018-12-21 DIAGNOSIS — M16.12 PRIMARY OSTEOARTHRITIS OF ONE HIP, LEFT: ICD-10-CM

## 2018-12-21 PROCEDURE — 77002 NEEDLE LOCALIZATION BY XRAY: CPT

## 2018-12-21 PROCEDURE — 20610 DRAIN/INJ JOINT/BURSA W/O US: CPT

## 2018-12-21 RX ORDER — LIDOCAINE HYDROCHLORIDE 10 MG/ML
10 INJECTION, SOLUTION EPIDURAL; INFILTRATION; INTRACAUDAL; PERINEURAL ONCE
Status: COMPLETED | OUTPATIENT
Start: 2018-12-21 | End: 2018-12-21

## 2018-12-21 RX ORDER — METHYLPREDNISOLONE ACETATE 80 MG/ML
80 INJECTION, SUSPENSION INTRA-ARTICULAR; INTRALESIONAL; INTRAMUSCULAR; SOFT TISSUE ONCE
Status: COMPLETED | OUTPATIENT
Start: 2018-12-21 | End: 2018-12-21

## 2018-12-21 RX ORDER — BUPIVACAINE HYDROCHLORIDE 2.5 MG/ML
10 INJECTION, SOLUTION EPIDURAL; INFILTRATION; INTRACAUDAL ONCE
Status: COMPLETED | OUTPATIENT
Start: 2018-12-21 | End: 2018-12-21

## 2018-12-21 RX ADMIN — METHYLPREDNISOLONE ACETATE 80 MG: 80 INJECTION, SUSPENSION INTRA-ARTICULAR; INTRALESIONAL; INTRAMUSCULAR; SOFT TISSUE at 13:35

## 2018-12-21 RX ADMIN — LIDOCAINE HYDROCHLORIDE 7 ML: 10 INJECTION, SOLUTION EPIDURAL; INFILTRATION; INTRACAUDAL; PERINEURAL at 13:35

## 2018-12-21 RX ADMIN — IOHEXOL 3 ML: 300 INJECTION, SOLUTION INTRAVENOUS at 13:34

## 2018-12-21 RX ADMIN — BUPIVACAINE HYDROCHLORIDE 2 ML: 2.5 INJECTION, SOLUTION EPIDURAL; INFILTRATION; INTRACAUDAL at 13:35

## 2019-01-25 ENCOUNTER — TRANSCRIBE ORDERS (OUTPATIENT)
Dept: ADMINISTRATIVE | Facility: HOSPITAL | Age: 84
End: 2019-01-25

## 2019-01-25 ENCOUNTER — APPOINTMENT (OUTPATIENT)
Dept: LAB | Facility: MEDICAL CENTER | Age: 84
End: 2019-01-25
Payer: MEDICARE

## 2019-01-25 DIAGNOSIS — R23.3 SPONTANEOUS ECCHYMOSES: Primary | ICD-10-CM

## 2019-01-25 LAB
BASOPHILS # BLD AUTO: 0.05 THOUSANDS/ΜL (ref 0–0.1)
BASOPHILS NFR BLD AUTO: 1 % (ref 0–1)
EOSINOPHIL # BLD AUTO: 0.14 THOUSAND/ΜL (ref 0–0.61)
EOSINOPHIL NFR BLD AUTO: 2 % (ref 0–6)
ERYTHROCYTE [DISTWIDTH] IN BLOOD BY AUTOMATED COUNT: 13.3 % (ref 11.6–15.1)
HCT VFR BLD AUTO: 44.5 % (ref 34.8–46.1)
HGB BLD-MCNC: 14.3 G/DL (ref 11.5–15.4)
IMM GRANULOCYTES # BLD AUTO: 0.03 THOUSAND/UL (ref 0–0.2)
IMM GRANULOCYTES NFR BLD AUTO: 0 % (ref 0–2)
INR PPP: 1 (ref 0.86–1.17)
LYMPHOCYTES # BLD AUTO: 2.67 THOUSANDS/ΜL (ref 0.6–4.47)
LYMPHOCYTES NFR BLD AUTO: 37 % (ref 14–44)
MCH RBC QN AUTO: 29.6 PG (ref 26.8–34.3)
MCHC RBC AUTO-ENTMCNC: 32.1 G/DL (ref 31.4–37.4)
MCV RBC AUTO: 92 FL (ref 82–98)
MONOCYTES # BLD AUTO: 0.69 THOUSAND/ΜL (ref 0.17–1.22)
MONOCYTES NFR BLD AUTO: 10 % (ref 4–12)
NEUTROPHILS # BLD AUTO: 3.61 THOUSANDS/ΜL (ref 1.85–7.62)
NEUTS SEG NFR BLD AUTO: 50 % (ref 43–75)
NRBC BLD AUTO-RTO: 0 /100 WBCS
PLATELET # BLD AUTO: 203 THOUSANDS/UL (ref 149–390)
PMV BLD AUTO: 11.5 FL (ref 8.9–12.7)
PROTHROMBIN TIME: 13.3 SECONDS (ref 11.8–14.2)
RBC # BLD AUTO: 4.83 MILLION/UL (ref 3.81–5.12)
WBC # BLD AUTO: 7.19 THOUSAND/UL (ref 4.31–10.16)

## 2019-01-25 PROCEDURE — 85610 PROTHROMBIN TIME: CPT | Performed by: FAMILY MEDICINE

## 2019-01-25 PROCEDURE — 36415 COLL VENOUS BLD VENIPUNCTURE: CPT | Performed by: FAMILY MEDICINE

## 2019-01-25 PROCEDURE — 85025 COMPLETE CBC W/AUTO DIFF WBC: CPT | Performed by: FAMILY MEDICINE

## 2019-03-08 ENCOUNTER — TRANSCRIBE ORDERS (OUTPATIENT)
Dept: ADMINISTRATIVE | Facility: HOSPITAL | Age: 84
End: 2019-03-08

## 2019-03-08 ENCOUNTER — OFFICE VISIT (OUTPATIENT)
Dept: OBGYN CLINIC | Facility: MEDICAL CENTER | Age: 84
End: 2019-03-08
Payer: MEDICARE

## 2019-03-08 VITALS
DIASTOLIC BLOOD PRESSURE: 76 MMHG | HEIGHT: 58 IN | BODY MASS INDEX: 33.58 KG/M2 | HEART RATE: 70 BPM | SYSTOLIC BLOOD PRESSURE: 154 MMHG | WEIGHT: 160 LBS | RESPIRATION RATE: 18 BRPM

## 2019-03-08 DIAGNOSIS — M70.62 TROCHANTERIC BURSITIS OF LEFT HIP: Primary | ICD-10-CM

## 2019-03-08 DIAGNOSIS — M16.12 PRIMARY OSTEOARTHRITIS OF ONE HIP, LEFT: ICD-10-CM

## 2019-03-08 PROCEDURE — 20610 DRAIN/INJ JOINT/BURSA W/O US: CPT | Performed by: ORTHOPAEDIC SURGERY

## 2019-03-08 PROCEDURE — 99213 OFFICE O/P EST LOW 20 MIN: CPT | Performed by: ORTHOPAEDIC SURGERY

## 2019-03-08 RX ORDER — LIDOCAINE HYDROCHLORIDE 10 MG/ML
2 INJECTION, SOLUTION INFILTRATION; PERINEURAL
Status: COMPLETED | OUTPATIENT
Start: 2019-03-08 | End: 2019-03-08

## 2019-03-08 RX ORDER — AMITRIPTYLINE HYDROCHLORIDE 25 MG/1
TABLET, FILM COATED ORAL
Refills: 5 | COMMUNITY
Start: 2019-02-18 | End: 2019-07-05

## 2019-03-08 RX ORDER — BETAMETHASONE SODIUM PHOSPHATE AND BETAMETHASONE ACETATE 3; 3 MG/ML; MG/ML
6 INJECTION, SUSPENSION INTRA-ARTICULAR; INTRALESIONAL; INTRAMUSCULAR; SOFT TISSUE
Status: COMPLETED | OUTPATIENT
Start: 2019-03-08 | End: 2019-03-08

## 2019-03-08 RX ADMIN — LIDOCAINE HYDROCHLORIDE 2 ML: 10 INJECTION, SOLUTION INFILTRATION; PERINEURAL at 13:29

## 2019-03-08 RX ADMIN — BETAMETHASONE SODIUM PHOSPHATE AND BETAMETHASONE ACETATE 6 MG: 3; 3 INJECTION, SUSPENSION INTRA-ARTICULAR; INTRALESIONAL; INTRAMUSCULAR; SOFT TISSUE at 13:29

## 2019-03-08 NOTE — PROGRESS NOTES
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 history of osteoarthritis of the left hip as well as trochanteric bursitis  She achieves temporary benefit by periodic injections  Her most recent hip injection was performed in December 2018  She presents today for a trochanteric bursa injection left lateral thigh, and she hopes to have a fluoroscopic guided injection ordered for her left hip in the near future  She is accompanied by her son at today's visit no new x-rays are necessary  Past Medical History:   Diagnosis Date    Hypertension        Past Surgical History:   Procedure Laterality Date    FL INJECTION LEFT HIP (NON ARTHROGRAM)  9/11/2018    FL INJECTION LEFT HIP (NON ARTHROGRAM)  12/21/2018    HYSTERECTOMY         Family History   Problem Relation Age of Onset    Stroke Mother     Cancer Father        Social History     Tobacco Use    Smoking status: Never Smoker    Smokeless tobacco: Never Used   Substance Use Topics    Alcohol use: No    Drug use: No     Exam;    This is an age appropriate adult female individual in no acute distress  With hip flexion and internal rotation right hip there is no pain  With attempt at hip flexion 90° and internal rotation there is a hard stop and she lifts the left buttock off the exam table secondary to pain  She also has reproducible pain to direct palpation over the trochanteric flare the proximal lateral hip, and thigh, left hip only  Large joint arthrocentesis: L greater trochanteric bursa  Date/Time: 3/8/2019 1:29 PM  Consent given by: patient  Procedure Details  Location: hip - L greater trochanteric bursa  Needle size: 22 G  Approach: lateral  Medications administered: 2 mL lidocaine 1 %; 6 mg betamethasone acetate-betamethasone sodium phosphate 6 (3-3) mg/mL    Patient tolerance: patient tolerated the procedure well with no immediate complications  Dressing:  Sterile dressing applied        Impression;     Left hip pain  Left hip bursitis  Left hip trochanteric bursitis    Plan;    She was afforded a well placed injection to her left greater trochanteric bursa  We placed an order for her to receive a fluoroscopic guided left hip injection  We will see her in follow-up in an additional 3 months  Her treatment was provided by and plan formulated by the attending surgeon it was my privilege to assist him in its delivery

## 2019-03-08 NOTE — PROGRESS NOTES
Assessment:  No diagnosis found  Plan:      To do next visit:  No follow-ups on file  The above stated was discussed in layman's terms and the patient expressed understanding  All questions were answered to the patient's satisfaction         Scribe Attestation    I,:   Wild Dunlap am acting as a scribe while in the presence of the attending physician :        I,:   Avis Brody MD personally performed the services described in this documentation    as scribed in my presence :              Subjective:   Gulshan Plunkett is a 80 y o  female who presents       Review of systems negative unless otherwise specified in HPI    Past Medical History:   Diagnosis Date    Hypertension        Past Surgical History:   Procedure Laterality Date    FL INJECTION LEFT HIP (NON ARTHROGRAM)  9/11/2018    FL INJECTION LEFT HIP (NON ARTHROGRAM)  12/21/2018    HYSTERECTOMY         Family History   Problem Relation Age of Onset    Stroke Mother     Cancer Father        Social History     Occupational History    Not on file   Tobacco Use    Smoking status: Never Smoker    Smokeless tobacco: Never Used   Substance and Sexual Activity    Alcohol use: No    Drug use: No    Sexual activity: Not on file         Current Outpatient Medications:     amLODIPine (NORVASC) 5 mg tablet, Take 5 mg by mouth daily, Disp: , Rfl: 3    Calcium Citrate (CITRACAL PO), Take by mouth, Disp: , Rfl:     Glucos-Chond-Sterol-Fish Oil (GLUCOSAMINE CHONDROITIN PLUS) CAPS, Take 1 capsule by mouth daily, Disp: , Rfl:     lisinopril (ZESTRIL) 5 mg tablet, Take 5 mg by mouth daily, Disp: , Rfl: 3    meloxicam (MOBIC) 7 5 mg tablet, Take 7 5 mg by mouth daily, Disp: , Rfl: 5    omeprazole (PriLOSEC) 20 mg delayed release capsule, Take 20 mg by mouth daily, Disp: , Rfl: 3    TRAVATAN Z 0 004 % ophthalmic solution, PUT 1 DROP INTO BOTH EYES AT BEDTIME, Disp: , Rfl: 1    amitriptyline (ELAVIL) 25 mg tablet, TAKE 1 TABLET BY MOUTH EVERY DAY AT NIGHT, Disp: , Rfl: 5    Allergies   Allergen Reactions    Ampicillin             Vitals:    03/08/19 1314   BP: 154/76   Pulse:    Resp:        Objective:                    Ortho Exam    Diagnostics, reviewed and taken today if performed as documented:    None performed      The attending physician has personally reviewed the pertinent films in PACS and interpretation is as follows:      Procedures, if performed today:    Procedures    None performed      Portions of the record may have been created with voice recognition software   Occasional wrong word or "sound a like" substitutions may have occurred due to the inherent limitations of voice recognition software   Read the chart carefully and recognize, using context, where substitutions have occurred

## 2019-03-12 ENCOUNTER — HOSPITAL ENCOUNTER (OUTPATIENT)
Dept: RADIOLOGY | Facility: HOSPITAL | Age: 84
Discharge: HOME | End: 2019-03-12
Payer: MEDICARE

## 2019-03-12 DIAGNOSIS — M16.12 PRIMARY OSTEOARTHRITIS OF ONE HIP, LEFT: ICD-10-CM

## 2019-03-12 PROCEDURE — 77002 NEEDLE LOCALIZATION BY XRAY: CPT

## 2019-03-12 PROCEDURE — 20610 DRAIN/INJ JOINT/BURSA W/O US: CPT

## 2019-03-12 RX ORDER — LIDOCAINE HYDROCHLORIDE 10 MG/ML
10 INJECTION, SOLUTION INFILTRATION; PERINEURAL
Status: COMPLETED | OUTPATIENT
Start: 2019-03-12 | End: 2019-03-12

## 2019-03-12 RX ORDER — BUPIVACAINE HYDROCHLORIDE 2.5 MG/ML
30 INJECTION, SOLUTION EPIDURAL; INFILTRATION; INTRACAUDAL
Status: COMPLETED | OUTPATIENT
Start: 2019-03-12 | End: 2019-03-12

## 2019-03-12 RX ORDER — METHYLPREDNISOLONE ACETATE 80 MG/ML
80 INJECTION, SUSPENSION INTRA-ARTICULAR; INTRALESIONAL; INTRAMUSCULAR; SOFT TISSUE
Status: COMPLETED | OUTPATIENT
Start: 2019-03-12 | End: 2019-03-12

## 2019-03-12 RX ADMIN — BUPIVACAINE HYDROCHLORIDE 2 ML: 2.5 INJECTION, SOLUTION EPIDURAL; INFILTRATION; INTRACAUDAL at 13:30

## 2019-03-12 RX ADMIN — IOHEXOL 3 ML: 300 INJECTION, SOLUTION INTRAVENOUS at 13:30

## 2019-03-12 RX ADMIN — METHYLPREDNISOLONE ACETATE 80 MG: 80 INJECTION, SUSPENSION INTRA-ARTICULAR; INTRALESIONAL; INTRAMUSCULAR; SOFT TISSUE at 13:30

## 2019-03-12 RX ADMIN — LIDOCAINE HYDROCHLORIDE 2 ML: 10 INJECTION, SOLUTION INFILTRATION; PERINEURAL at 13:30

## 2019-03-18 ENCOUNTER — TRANSCRIBE ORDERS (OUTPATIENT)
Dept: ADMINISTRATIVE | Facility: HOSPITAL | Age: 84
End: 2019-03-18

## 2019-03-18 DIAGNOSIS — M79.605 LEFT LEG PAIN: Primary | ICD-10-CM

## 2019-03-18 DIAGNOSIS — R60.0 LEG EDEMA, LEFT: ICD-10-CM

## 2019-03-20 ENCOUNTER — HOSPITAL ENCOUNTER (OUTPATIENT)
Dept: RADIOLOGY | Facility: MEDICAL CENTER | Age: 84
Discharge: HOME | End: 2019-03-20
Payer: MEDICARE

## 2019-03-20 DIAGNOSIS — M79.605 LEFT LEG PAIN: ICD-10-CM

## 2019-03-20 DIAGNOSIS — R60.0 LEG EDEMA, LEFT: ICD-10-CM

## 2019-03-20 PROCEDURE — 93971 EXTREMITY STUDY: CPT

## 2019-03-20 PROCEDURE — 93971 EXTREMITY STUDY: CPT | Performed by: SURGERY

## 2019-07-05 ENCOUNTER — OFFICE VISIT (OUTPATIENT)
Dept: OBGYN CLINIC | Facility: MEDICAL CENTER | Age: 84
End: 2019-07-05
Payer: MEDICARE

## 2019-07-05 VITALS
BODY MASS INDEX: 35.05 KG/M2 | RESPIRATION RATE: 20 BRPM | HEIGHT: 58 IN | SYSTOLIC BLOOD PRESSURE: 165 MMHG | DIASTOLIC BLOOD PRESSURE: 71 MMHG | HEART RATE: 71 BPM | WEIGHT: 167 LBS

## 2019-07-05 DIAGNOSIS — M70.62 TROCHANTERIC BURSITIS OF LEFT HIP: Primary | ICD-10-CM

## 2019-07-05 DIAGNOSIS — M16.12 PRIMARY OSTEOARTHRITIS OF ONE HIP, LEFT: ICD-10-CM

## 2019-07-05 DIAGNOSIS — M70.61 TROCHANTERIC BURSITIS OF RIGHT HIP: ICD-10-CM

## 2019-07-05 PROCEDURE — 99213 OFFICE O/P EST LOW 20 MIN: CPT | Performed by: ORTHOPAEDIC SURGERY

## 2019-07-05 PROCEDURE — 20610 DRAIN/INJ JOINT/BURSA W/O US: CPT | Performed by: ORTHOPAEDIC SURGERY

## 2019-07-05 RX ORDER — LIDOCAINE HYDROCHLORIDE 10 MG/ML
2 INJECTION, SOLUTION INFILTRATION; PERINEURAL
Status: COMPLETED | OUTPATIENT
Start: 2019-07-05 | End: 2019-07-05

## 2019-07-05 RX ORDER — BUPIVACAINE HYDROCHLORIDE 2.5 MG/ML
2 INJECTION, SOLUTION INFILTRATION; PERINEURAL
Status: COMPLETED | OUTPATIENT
Start: 2019-07-05 | End: 2019-07-05

## 2019-07-05 RX ORDER — BETAMETHASONE SODIUM PHOSPHATE AND BETAMETHASONE ACETATE 3; 3 MG/ML; MG/ML
12 INJECTION, SUSPENSION INTRA-ARTICULAR; INTRALESIONAL; INTRAMUSCULAR; SOFT TISSUE
Status: COMPLETED | OUTPATIENT
Start: 2019-07-05 | End: 2019-07-05

## 2019-07-05 RX ADMIN — BETAMETHASONE SODIUM PHOSPHATE AND BETAMETHASONE ACETATE 12 MG: 3; 3 INJECTION, SUSPENSION INTRA-ARTICULAR; INTRALESIONAL; INTRAMUSCULAR; SOFT TISSUE at 13:38

## 2019-07-05 RX ADMIN — LIDOCAINE HYDROCHLORIDE 2 ML: 10 INJECTION, SOLUTION INFILTRATION; PERINEURAL at 13:38

## 2019-07-05 RX ADMIN — BUPIVACAINE HYDROCHLORIDE 2 ML: 2.5 INJECTION, SOLUTION INFILTRATION; PERINEURAL at 13:38

## 2019-07-05 NOTE — PROGRESS NOTES
89 y o female presenting as a follow up for left hip pain  During her last visit she was administered a CSI injection to her greater trochanter, and scheduled as an outpatient for fluoroscopy guided intra-articular injection, which she completed on 3/12  She reports significant improvement after both of these injections, although the affects have worn off at this point in time  Today she reports left groin and bilateral lateral hip pain  Groin worse than lateral hip pain  Review of Systems  Review of systems negative unless otherwise specified in HPI    Past Medical History  Past Medical History:   Diagnosis Date    Hypertension        Past Surgical History  Past Surgical History:   Procedure Laterality Date    FL INJECTION LEFT HIP (NON ARTHROGRAM)  9/11/2018    FL INJECTION LEFT HIP (NON ARTHROGRAM)  12/21/2018    FL INJECTION LEFT HIP (NON ARTHROGRAM)  3/12/2019    HYSTERECTOMY         Current Medications  Current Outpatient Medications on File Prior to Visit   Medication Sig Dispense Refill    amLODIPine (NORVASC) 5 mg tablet Take 5 mg by mouth daily  3    Calcium Citrate (CITRACAL PO) Take by mouth      Glucos-Chond-Sterol-Fish Oil (GLUCOSAMINE CHONDROITIN PLUS) CAPS Take 1 capsule by mouth daily      lisinopril (ZESTRIL) 5 mg tablet Take 5 mg by mouth daily  3    meloxicam (MOBIC) 7 5 mg tablet Take 7 5 mg by mouth daily  5    omeprazole (PriLOSEC) 20 mg delayed release capsule Take 20 mg by mouth daily  3    TRAVATAN Z 0 004 % ophthalmic solution PUT 1 DROP INTO BOTH EYES AT BEDTIME  1    [DISCONTINUED] amitriptyline (ELAVIL) 25 mg tablet TAKE 1 TABLET BY MOUTH EVERY DAY AT NIGHT  5     No current facility-administered medications on file prior to visit          Recent Labs [facility])  0   Lab Value Date/Time    HCT 44 5 01/25/2019 0818    HCT 44 7 08/25/2015 0837    HGB 14 3 01/25/2019 0818    HGB 15 6 (H) 08/25/2015 0837    WBC 7 19 01/25/2019 0818    WBC 5 98 08/25/2015 0837    INR 1 00 01/25/2019 0818    GLUCOSE 95 08/25/2015 0837         Physical exam  · General: Awake, Alert, Oriented  · Eyes: Pupils equal, round and reactive to light  · Heart: regular rate and rhythm  · Lungs: No audible wheezing  · Abdomen: soft  MSK BL hips  -TTP to the greater trochanters bilaterally  -Pain with internal rotation of the hip, left worse than right  -Otherwise motor and sensation grossly intact L2-S1    Imaging  No new imaging  Previous imaging reveals moderate degenerative changes of the left hip  Procedure  Large joint arthrocentesis: bilateral greater trochanteric bursa  Date/Time: 7/5/2019 1:38 PM  Consent given by: patient  Site marked: site marked  Timeout: Immediately prior to procedure a time out was called to verify the correct patient, procedure, equipment, support staff and site/side marked as required   Supporting Documentation  Indications: pain   Procedure Details  Location: hip - bilateral greater trochanteric bursa  Preparation: Patient was prepped and draped in the usual sterile fashion  Needle size: 22 G  Approach: lateral    Medications (Right): 2 mL bupivacaine 0 25 %; 12 mg betamethasone acetate-betamethasone sodium phosphate 6 (3-3) mg/mL; 2 mL lidocaine 1 %Medications (Left): 2 mL bupivacaine 0 25 %; 12 mg betamethasone acetate-betamethasone sodium phosphate 6 (3-3) mg/mL; 2 mL lidocaine 1 %   Patient tolerance: patient tolerated the procedure well with no immediate complications  Dressing:  Sterile dressing applied            Assessment/Plan:   89 y  o female presenting with bilateral greater trochanteric bursitis, as well as left hip osteoarthritis  She was given bl greater trochanteric injections during this visit  Additionally, an order was placed for a fluoroscopically guided L intra-articular corticosteroid injection to be completed at the patient's leisure  She was instructed to return to the office in 3 months, or sooner if necessary

## 2019-07-09 ENCOUNTER — TELEPHONE (OUTPATIENT)
Dept: OBGYN CLINIC | Facility: HOSPITAL | Age: 84
End: 2019-07-09

## 2019-07-09 DIAGNOSIS — M16.12 PRIMARY OSTEOARTHRITIS OF LEFT HIP: Primary | ICD-10-CM

## 2019-07-09 NOTE — TELEPHONE ENCOUNTER
Edward PEREZ/B #   Dr Satya Macdonald     Patient's nephew called to ask for a script for the [facility] injection   Thanks

## 2019-07-23 ENCOUNTER — TRANSCRIBE ORDERS (OUTPATIENT)
Dept: RADIOLOGY | Facility: HOSPITAL | Age: 84
End: 2019-07-23

## 2019-07-23 ENCOUNTER — HOSPITAL ENCOUNTER (OUTPATIENT)
Dept: RADIOLOGY | Facility: HOSPITAL | Age: 84
Discharge: HOME | End: 2019-07-23
Payer: MEDICARE

## 2019-07-23 DIAGNOSIS — M16.12 PRIMARY OSTEOARTHRITIS OF ONE HIP, LEFT: ICD-10-CM

## 2019-07-23 PROCEDURE — 77002 NEEDLE LOCALIZATION BY XRAY: CPT

## 2019-07-23 PROCEDURE — 20610 DRAIN/INJ JOINT/BURSA W/O US: CPT

## 2019-07-23 RX ORDER — BUPIVACAINE HYDROCHLORIDE 2.5 MG/ML
10 INJECTION, SOLUTION EPIDURAL; INFILTRATION; INTRACAUDAL ONCE
Status: COMPLETED | OUTPATIENT
Start: 2019-07-23 | End: 2019-07-23

## 2019-07-23 RX ORDER — LIDOCAINE HYDROCHLORIDE 10 MG/ML
10 INJECTION, SOLUTION EPIDURAL; INFILTRATION; INTRACAUDAL; PERINEURAL ONCE
Status: COMPLETED | OUTPATIENT
Start: 2019-07-23 | End: 2019-07-23

## 2019-07-23 RX ORDER — METHYLPREDNISOLONE ACETATE 80 MG/ML
80 INJECTION, SUSPENSION INTRA-ARTICULAR; INTRALESIONAL; INTRAMUSCULAR; SOFT TISSUE ONCE
Status: COMPLETED | OUTPATIENT
Start: 2019-07-23 | End: 2019-07-23

## 2019-07-23 RX ADMIN — METHYLPREDNISOLONE ACETATE 80 MG: 80 INJECTION, SUSPENSION INTRA-ARTICULAR; INTRALESIONAL; INTRAMUSCULAR; SOFT TISSUE at 15:20

## 2019-07-23 RX ADMIN — BUPIVACAINE HYDROCHLORIDE 2 ML: 2.5 INJECTION, SOLUTION EPIDURAL; INFILTRATION; INTRACAUDAL; PERINEURAL at 15:20

## 2019-07-23 RX ADMIN — IOHEXOL 3 ML: 300 INJECTION, SOLUTION INTRAVENOUS at 15:29

## 2019-07-23 RX ADMIN — LIDOCAINE HYDROCHLORIDE 7 ML: 10 INJECTION, SOLUTION EPIDURAL; INFILTRATION; INTRACAUDAL; PERINEURAL at 15:30

## 2019-07-23 NOTE — TELEPHONE ENCOUNTER
Dr Huggins Sheets patient    Lexi Pitts from [facility] radiology called to ask if you want to use celestone or depomedrol for Nick Fox's hip injection  Both are ordered but only one can be used  Please call     Thank you

## 2019-10-11 ENCOUNTER — OFFICE VISIT (OUTPATIENT)
Dept: OBGYN CLINIC | Facility: MEDICAL CENTER | Age: 84
End: 2019-10-11
Payer: MEDICARE

## 2019-10-11 VITALS
HEIGHT: 58 IN | DIASTOLIC BLOOD PRESSURE: 76 MMHG | WEIGHT: 167 LBS | SYSTOLIC BLOOD PRESSURE: 185 MMHG | BODY MASS INDEX: 35.05 KG/M2 | HEART RATE: 81 BPM

## 2019-10-11 DIAGNOSIS — M70.62 TROCHANTERIC BURSITIS OF LEFT HIP: ICD-10-CM

## 2019-10-11 DIAGNOSIS — M70.61 TROCHANTERIC BURSITIS OF RIGHT HIP: ICD-10-CM

## 2019-10-11 DIAGNOSIS — M16.12 PRIMARY OSTEOARTHRITIS OF LEFT HIP: Primary | ICD-10-CM

## 2019-10-11 PROCEDURE — 20610 DRAIN/INJ JOINT/BURSA W/O US: CPT | Performed by: ORTHOPAEDIC SURGERY

## 2019-10-11 PROCEDURE — 99213 OFFICE O/P EST LOW 20 MIN: CPT | Performed by: ORTHOPAEDIC SURGERY

## 2019-10-11 RX ORDER — BUPIVACAINE HYDROCHLORIDE 2.5 MG/ML
2 INJECTION, SOLUTION INFILTRATION; PERINEURAL
Status: COMPLETED | OUTPATIENT
Start: 2019-10-11 | End: 2019-10-11

## 2019-10-11 RX ORDER — BETAMETHASONE SODIUM PHOSPHATE AND BETAMETHASONE ACETATE 3; 3 MG/ML; MG/ML
12 INJECTION, SUSPENSION INTRA-ARTICULAR; INTRALESIONAL; INTRAMUSCULAR; SOFT TISSUE
Status: COMPLETED | OUTPATIENT
Start: 2019-10-11 | End: 2019-10-11

## 2019-10-11 RX ORDER — LIDOCAINE HYDROCHLORIDE 10 MG/ML
2 INJECTION, SOLUTION INFILTRATION; PERINEURAL
Status: COMPLETED | OUTPATIENT
Start: 2019-10-11 | End: 2019-10-11

## 2019-10-11 RX ADMIN — BUPIVACAINE HYDROCHLORIDE 2 ML: 2.5 INJECTION, SOLUTION INFILTRATION; PERINEURAL at 13:20

## 2019-10-11 RX ADMIN — BETAMETHASONE SODIUM PHOSPHATE AND BETAMETHASONE ACETATE 12 MG: 3; 3 INJECTION, SUSPENSION INTRA-ARTICULAR; INTRALESIONAL; INTRAMUSCULAR; SOFT TISSUE at 13:20

## 2019-10-11 RX ADMIN — LIDOCAINE HYDROCHLORIDE 2 ML: 10 INJECTION, SOLUTION INFILTRATION; PERINEURAL at 13:20

## 2019-10-11 NOTE — PROGRESS NOTES
80 y o female follow-up for left hip osteoarthritis and bilateral greater trochanteric bursitis  At her last visit she was provided with bilateral corticosteroid injections to the greater troch region  Additionally she was given a prescription for fluoroscopically guided intra-articular injection to the left hip  She reports that these injections have provided significant pain relief for her, although they have not been as effective as her initial injections  She is interested in receiving repeat injections today  Review of Systems  Review of systems negative unless otherwise specified in HPI    Past Medical History  Past Medical History:   Diagnosis Date    Hypertension        Past Surgical History  Past Surgical History:   Procedure Laterality Date    FL INJECTION LEFT HIP (NON ARTHROGRAM)  9/11/2018    FL INJECTION LEFT HIP (NON ARTHROGRAM)  12/21/2018    FL INJECTION LEFT HIP (NON ARTHROGRAM)  3/12/2019    FL INJECTION LEFT HIP (NON ARTHROGRAM)  7/23/2019    HYSTERECTOMY         Current Medications  Current Outpatient Medications on File Prior to Visit   Medication Sig Dispense Refill    amLODIPine (NORVASC) 5 mg tablet Take 5 mg by mouth daily  3    Calcium Citrate (CITRACAL PO) Take by mouth      Glucos-Chond-Sterol-Fish Oil (GLUCOSAMINE CHONDROITIN PLUS) CAPS Take 1 capsule by mouth daily      lisinopril (ZESTRIL) 5 mg tablet Take 5 mg by mouth daily  3    meloxicam (MOBIC) 7 5 mg tablet Take 7 5 mg by mouth daily  5    omeprazole (PriLOSEC) 20 mg delayed release capsule Take 20 mg by mouth daily  3    TRAVATAN Z 0 004 % ophthalmic solution PUT 1 DROP INTO BOTH EYES AT BEDTIME  1     No current facility-administered medications on file prior to visit          Recent Labs [facility])  0   Lab Value Date/Time    HCT 44 5 01/25/2019 0818    HCT 44 7 08/25/2015 0837    HGB 14 3 01/25/2019 0818    HGB 15 6 (H) 08/25/2015 0837    WBC 7 19 01/25/2019 0818    WBC 5 98 08/25/2015 0837    INR 1 00 01/25/2019 0818    GLUCOSE 95 08/25/2015 0837         Physical exam  · General: Awake, Alert, Oriented  · Eyes: Pupils equal, round and reactive to light  · Heart: regular rate and rhythm  · Lungs: No audible wheezing  · Abdomen: soft  MSK bilateral hips  -skin intact over the bilateral hips  -tender palpation over the bilateral greater troch flares  -no pain with gentle range of motion of left hip  -motor intact hip flexion, knee flexion/extension, ankle dorsiflexion/plantar flexion, EHL/FHL  -sensation intact L3-S1  -2 second capillary refill    Imaging  No new imaging this encounter    Procedure  Large joint arthrocentesis: bilateral hip joint  Date/Time: 10/11/2019 1:20 PM  Consent given by: patient  Site marked: site marked  Timeout: Immediately prior to procedure a time out was called to verify the correct patient, procedure, equipment, support staff and site/side marked as required   Supporting Documentation  Indications: pain   Procedure Details  Location: hip - bilateral hip joint  Needle size: 22 G  Approach: lateral    Medications (Right): 2 mL bupivacaine 0 25 %; 2 mL lidocaine 1 %; 12 mg betamethasone acetate-betamethasone sodium phosphate 6 (3-3) mg/mLMedications (Left): 2 mL bupivacaine 0 25 %; 2 mL lidocaine 1 %; 12 mg betamethasone acetate-betamethasone sodium phosphate 6 (3-3) mg/mL   Patient tolerance: patient tolerated the procedure well with no immediate complications  Dressing:  Sterile dressing applied            Assessment/Plan:   80 y  o female presenting with bilateral greater troch bursitis, and left hip osteoarthritis  Patient was offered and administered bilateral greater troch corticosteroid injections  Additionally, patient will be scheduled for intra-articular, left hip corticosteroid injection with Radiology  Patient was instructed to follow up with us again in 3 months for repeat examination possible injection if indicated at that time

## 2019-10-16 ENCOUNTER — TRANSCRIBE ORDERS (OUTPATIENT)
Dept: RADIOLOGY | Facility: HOSPITAL | Age: 84
End: 2019-10-16

## 2019-10-16 ENCOUNTER — HOSPITAL ENCOUNTER (OUTPATIENT)
Dept: RADIOLOGY | Facility: HOSPITAL | Age: 84
Discharge: HOME | End: 2019-10-16
Payer: MEDICARE

## 2019-10-16 DIAGNOSIS — M16.12 PRIMARY OSTEOARTHRITIS OF LEFT HIP: ICD-10-CM

## 2019-10-16 PROCEDURE — 20610 DRAIN/INJ JOINT/BURSA W/O US: CPT

## 2019-10-16 PROCEDURE — 77002 NEEDLE LOCALIZATION BY XRAY: CPT

## 2019-10-16 RX ORDER — BUPIVACAINE HYDROCHLORIDE 2.5 MG/ML
10 INJECTION, SOLUTION EPIDURAL; INFILTRATION; INTRACAUDAL
Status: COMPLETED | OUTPATIENT
Start: 2019-10-16 | End: 2019-10-16

## 2019-10-16 RX ORDER — METHYLPREDNISOLONE ACETATE 80 MG/ML
80 INJECTION, SUSPENSION INTRA-ARTICULAR; INTRALESIONAL; INTRAMUSCULAR; SOFT TISSUE
Status: COMPLETED | OUTPATIENT
Start: 2019-10-16 | End: 2019-10-16

## 2019-10-16 RX ORDER — LIDOCAINE HYDROCHLORIDE 10 MG/ML
5 INJECTION, SOLUTION INFILTRATION; PERINEURAL
Status: COMPLETED | OUTPATIENT
Start: 2019-10-16 | End: 2019-10-16

## 2019-10-16 RX ORDER — BETAMETHASONE SODIUM PHOSPHATE AND BETAMETHASONE ACETATE 3; 3 MG/ML; MG/ML
12 INJECTION, SUSPENSION INTRA-ARTICULAR; INTRALESIONAL; INTRAMUSCULAR; SOFT TISSUE ONCE
Status: DISCONTINUED | OUTPATIENT
Start: 2019-10-16 | End: 2019-10-17 | Stop reason: HOSPADM

## 2019-10-16 RX ADMIN — IOHEXOL 3 ML: 300 INJECTION, SOLUTION INTRAVENOUS at 15:50

## 2019-10-16 RX ADMIN — BUPIVACAINE HYDROCHLORIDE 2 ML: 2.5 INJECTION, SOLUTION EPIDURAL; INFILTRATION; INTRACAUDAL; PERINEURAL at 15:50

## 2019-10-16 RX ADMIN — METHYLPREDNISOLONE ACETATE 80 MG: 80 INJECTION, SUSPENSION INTRA-ARTICULAR; INTRALESIONAL; INTRAMUSCULAR; SOFT TISSUE at 15:50

## 2019-10-16 RX ADMIN — LIDOCAINE HYDROCHLORIDE 2 ML: 10 INJECTION, SOLUTION INFILTRATION; PERINEURAL at 15:50

## 2020-01-10 ENCOUNTER — OFFICE VISIT (OUTPATIENT)
Dept: OBGYN CLINIC | Facility: MEDICAL CENTER | Age: 85
End: 2020-01-10
Payer: MEDICARE

## 2020-01-10 VITALS
WEIGHT: 161.2 LBS | HEART RATE: 79 BPM | HEIGHT: 58 IN | SYSTOLIC BLOOD PRESSURE: 183 MMHG | BODY MASS INDEX: 33.84 KG/M2 | DIASTOLIC BLOOD PRESSURE: 71 MMHG

## 2020-01-10 DIAGNOSIS — M70.61 TROCHANTERIC BURSITIS OF RIGHT HIP: ICD-10-CM

## 2020-01-10 DIAGNOSIS — M70.62 TROCHANTERIC BURSITIS OF LEFT HIP: Primary | ICD-10-CM

## 2020-01-10 DIAGNOSIS — M16.12 PRIMARY OSTEOARTHRITIS OF LEFT HIP: ICD-10-CM

## 2020-01-10 PROCEDURE — 99213 OFFICE O/P EST LOW 20 MIN: CPT | Performed by: ORTHOPAEDIC SURGERY

## 2020-01-10 PROCEDURE — 20610 DRAIN/INJ JOINT/BURSA W/O US: CPT | Performed by: ORTHOPAEDIC SURGERY

## 2020-01-10 RX ORDER — LIDOCAINE HYDROCHLORIDE 10 MG/ML
2 INJECTION, SOLUTION INFILTRATION; PERINEURAL
Status: COMPLETED | OUTPATIENT
Start: 2020-01-10 | End: 2020-01-10

## 2020-01-10 RX ORDER — BUPIVACAINE HYDROCHLORIDE 2.5 MG/ML
2 INJECTION, SOLUTION INFILTRATION; PERINEURAL
Status: COMPLETED | OUTPATIENT
Start: 2020-01-10 | End: 2020-01-10

## 2020-01-10 RX ORDER — BETAMETHASONE SODIUM PHOSPHATE AND BETAMETHASONE ACETATE 3; 3 MG/ML; MG/ML
12 INJECTION, SUSPENSION INTRA-ARTICULAR; INTRALESIONAL; INTRAMUSCULAR; SOFT TISSUE
Status: COMPLETED | OUTPATIENT
Start: 2020-01-10 | End: 2020-01-10

## 2020-01-10 RX ADMIN — BETAMETHASONE SODIUM PHOSPHATE AND BETAMETHASONE ACETATE 12 MG: 3; 3 INJECTION, SUSPENSION INTRA-ARTICULAR; INTRALESIONAL; INTRAMUSCULAR; SOFT TISSUE at 13:49

## 2020-01-10 RX ADMIN — BUPIVACAINE HYDROCHLORIDE 2 ML: 2.5 INJECTION, SOLUTION INFILTRATION; PERINEURAL at 13:49

## 2020-01-10 RX ADMIN — LIDOCAINE HYDROCHLORIDE 2 ML: 10 INJECTION, SOLUTION INFILTRATION; PERINEURAL at 13:49

## 2020-01-10 NOTE — PROGRESS NOTES
Assessment:   Diagnosis ICD-10-CM Associated Orders   1  Trochanteric bursitis of left hip M70 62 Large joint arthrocentesis: bilateral greater trochanteric bursa   2  Trochanteric bursitis of right hip M70 61 Large joint arthrocentesis: bilateral greater trochanteric bursa   3  Primary osteoarthritis of left hip M16 12 FL injection left hip (non arthrogram)       Plan:  Bilateral hip trochanteric bursitis and left hip osteoarthritis  Patient was offered, accepted, performed cortisone injections to both trochanteric bursal areas  Patient tolerated both injections well  Ice and post injection protocol advised  We will have her schedule for a fluoroscopic guided left hip intra-articular cortisone injection performed by Radiology for symptomatic relief  Weightbearing and activities as tolerated  To do next visit:  Return in about 3 months (around 4/10/2020) for re-check  The above stated was discussed in layman's terms and the patient expressed understanding  All questions were answered to the patient's satisfaction  Scribe Attestation    I,:   Adelia Donovan am acting as a scribe while in the presence of the attending physician :        I,:   Elana Calixto MD personally performed the services described in this documentation    as scribed in my presence :              Subjective:   Jp Webb is a 80 y o  female who presents with her son for repeat evaluation of bilateral hips known trochanteric bursitis and left hip osteoarthritis  Patient continues to find relief of lateral based hip pain with periodic cortisone injections every 3 months  She also finds relief of her left hip groin pain with intra-articular injections in which her last 1 was performed 3 months ago        Review of systems negative unless otherwise specified in HPI    Past Medical History:   Diagnosis Date    Hypertension        Past Surgical History:   Procedure Laterality Date    FL INJECTION LEFT HIP (NON ARTHROGRAM) 9/11/2018    FL INJECTION LEFT HIP (NON ARTHROGRAM)  12/21/2018    FL INJECTION LEFT HIP (NON ARTHROGRAM)  3/12/2019    FL INJECTION LEFT HIP (NON ARTHROGRAM)  7/23/2019    FL INJECTION LEFT HIP (NON ARTHROGRAM)  10/16/2019    HYSTERECTOMY         Family History   Problem Relation Age of Onset    Stroke Mother     Cancer Father        Social History     Occupational History    Not on file   Tobacco Use    Smoking status: Never Smoker    Smokeless tobacco: Never Used   Substance and Sexual Activity    Alcohol use: No    Drug use: No    Sexual activity: Not on file         Current Outpatient Medications:     amLODIPine (NORVASC) 5 mg tablet, Take 5 mg by mouth daily, Disp: , Rfl: 3    Calcium Citrate (CITRACAL PO), Take by mouth, Disp: , Rfl:     Glucos-Chond-Sterol-Fish Oil (GLUCOSAMINE CHONDROITIN PLUS) CAPS, Take 1 capsule by mouth daily, Disp: , Rfl:     lisinopril (ZESTRIL) 5 mg tablet, Take 5 mg by mouth daily, Disp: , Rfl: 3    meloxicam (MOBIC) 7 5 mg tablet, Take 7 5 mg by mouth daily, Disp: , Rfl: 5    omeprazole (PriLOSEC) 20 mg delayed release capsule, Take 20 mg by mouth daily, Disp: , Rfl: 3    TRAVATAN Z 0 004 % ophthalmic solution, PUT 1 DROP INTO BOTH EYES AT BEDTIME, Disp: , Rfl: 1    Allergies   Allergen Reactions    Ampicillin             Vitals:    01/10/20 1330   BP: (!) 183/71   Pulse: 79       Objective:                    Right Hip Exam     Tenderness   The patient is experiencing tenderness in the greater trochanter  Muscle Strength   The patient has normal right hip strength (increased pain laterally with resistance to ABD)  Other   Erythema: absent  Sensation: normal      Left Hip Exam     Tenderness   The patient is experiencing tenderness in the greater trochanter  Muscle Strength   The patient has normal left hip strength (increased pain laterally with resistance to ABD)  Other   Erythema: absent  Sensation: normal    Comments:     Both hips have fairly good PROM in all planes with left groin pain at end ranges of flexion and IR with her hip flexed            Diagnostics, reviewed and taken today if performed as documented:    None performed          Procedures, if performed today:    Large joint arthrocentesis: bilateral greater trochanteric bursa  Date/Time: 1/10/2020 1:49 PM  Consent given by: patient  Site marked: site marked  Supporting Documentation  Indications: pain   Procedure Details  Location: hip - bilateral greater trochanteric bursa  Preparation: Patient was prepped and draped in the usual sterile fashion  Needle size: 22 G  Ultrasound guidance: no  Approach: lateral    Medications (Right): 2 mL bupivacaine 0 25 %; 2 mL lidocaine 1 %; 12 mg betamethasone acetate-betamethasone sodium phosphate 6 (3-3) mg/mLMedications (Left): 2 mL bupivacaine 0 25 %; 2 mL lidocaine 1 %; 12 mg betamethasone acetate-betamethasone sodium phosphate 6 (3-3) mg/mL   Patient tolerance: patient tolerated the procedure well with no immediate complications  Dressing:  Sterile dressing applied              Portions of the record may have been created with voice recognition software  Occasional wrong word or "sound a like" substitutions may have occurred due to the inherent limitations of voice recognition software  Read the chart carefully and recognize, using context, where substitutions have occurred

## 2020-01-21 ENCOUNTER — TRANSCRIBE ORDERS (OUTPATIENT)
Dept: RADIOLOGY | Facility: HOSPITAL | Age: 85
End: 2020-01-21

## 2020-01-21 ENCOUNTER — HOSPITAL ENCOUNTER (OUTPATIENT)
Dept: RADIOLOGY | Facility: HOSPITAL | Age: 85
Discharge: HOME | End: 2020-01-21
Attending: ORTHOPAEDIC SURGERY | Admitting: ORTHOPAEDIC SURGERY
Payer: MEDICARE

## 2020-01-21 DIAGNOSIS — M16.12 PRIMARY OSTEOARTHRITIS OF LEFT HIP: ICD-10-CM

## 2020-01-21 PROCEDURE — 20610 DRAIN/INJ JOINT/BURSA W/O US: CPT

## 2020-01-21 PROCEDURE — 77002 NEEDLE LOCALIZATION BY XRAY: CPT

## 2020-01-21 RX ORDER — LIDOCAINE HYDROCHLORIDE 10 MG/ML
5 INJECTION, SOLUTION EPIDURAL; INFILTRATION; INTRACAUDAL; PERINEURAL
Status: COMPLETED | OUTPATIENT
Start: 2020-01-21 | End: 2020-01-21

## 2020-01-21 RX ORDER — BUPIVACAINE HYDROCHLORIDE 2.5 MG/ML
10 INJECTION, SOLUTION EPIDURAL; INFILTRATION; INTRACAUDAL
Status: COMPLETED | OUTPATIENT
Start: 2020-01-21 | End: 2020-01-21

## 2020-01-21 RX ORDER — METHYLPREDNISOLONE ACETATE 80 MG/ML
80 INJECTION, SUSPENSION INTRA-ARTICULAR; INTRALESIONAL; INTRAMUSCULAR; SOFT TISSUE
Status: COMPLETED | OUTPATIENT
Start: 2020-01-21 | End: 2020-01-21

## 2020-01-21 RX ADMIN — IOHEXOL 10 ML: 300 INJECTION, SOLUTION INTRAVENOUS at 15:30

## 2020-01-21 RX ADMIN — LIDOCAINE HYDROCHLORIDE 5 ML: 10 INJECTION, SOLUTION EPIDURAL; INFILTRATION; INTRACAUDAL; PERINEURAL at 15:30

## 2020-01-21 RX ADMIN — METHYLPREDNISOLONE ACETATE 80 MG: 80 INJECTION, SUSPENSION INTRA-ARTICULAR; INTRALESIONAL; INTRAMUSCULAR; SOFT TISSUE at 15:30

## 2020-01-21 RX ADMIN — BUPIVACAINE HYDROCHLORIDE 5 ML: 2.5 INJECTION, SOLUTION EPIDURAL; INFILTRATION; INTRACAUDAL at 15:30

## 2020-01-25 ENCOUNTER — HOSPITAL ENCOUNTER (OUTPATIENT)
Facility: HOSPITAL | Age: 85
Setting detail: OBSERVATION
Discharge: HOME | DRG: 554 | End: 2020-01-26
Attending: EMERGENCY MEDICINE | Admitting: INTERNAL MEDICINE
Payer: MEDICARE

## 2020-01-25 ENCOUNTER — APPOINTMENT (EMERGENCY)
Dept: RADIOLOGY | Facility: HOSPITAL | Age: 85
DRG: 554 | End: 2020-01-25
Payer: MEDICARE

## 2020-01-25 ENCOUNTER — TELEPHONE (OUTPATIENT)
Dept: OBGYN CLINIC | Facility: HOSPITAL | Age: 85
End: 2020-01-25

## 2020-01-25 DIAGNOSIS — R26.2 AMBULATORY DYSFUNCTION: ICD-10-CM

## 2020-01-25 DIAGNOSIS — R70.0 ELEVATED SED RATE: ICD-10-CM

## 2020-01-25 DIAGNOSIS — R79.82 ELEVATED C-REACTIVE PROTEIN (CRP): ICD-10-CM

## 2020-01-25 DIAGNOSIS — M25.552 HIP PAIN, LEFT: Primary | ICD-10-CM

## 2020-01-25 PROBLEM — I10 ESSENTIAL HYPERTENSION: Status: ACTIVE | Noted: 2020-01-25

## 2020-01-25 LAB
ALBUMIN SERPL BCP-MCNC: 2.9 G/DL (ref 3.5–5)
ALP SERPL-CCNC: 101 U/L (ref 46–116)
ALT SERPL W P-5'-P-CCNC: 18 U/L (ref 12–78)
ANION GAP SERPL CALCULATED.3IONS-SCNC: 6 MMOL/L (ref 4–13)
AST SERPL W P-5'-P-CCNC: 15 U/L (ref 5–45)
BASOPHILS # BLD AUTO: 0.08 THOUSANDS/ΜL (ref 0–0.1)
BASOPHILS NFR BLD AUTO: 1 % (ref 0–1)
BILIRUB SERPL-MCNC: 0.84 MG/DL (ref 0.2–1)
BUN SERPL-MCNC: 28 MG/DL (ref 5–25)
CALCIUM SERPL-MCNC: 10 MG/DL (ref 8.3–10.1)
CHLORIDE SERPL-SCNC: 112 MMOL/L (ref 100–108)
CO2 SERPL-SCNC: 26 MMOL/L (ref 21–32)
CREAT SERPL-MCNC: 0.96 MG/DL (ref 0.6–1.3)
CRP SERPL QL: >90 MG/L
EOSINOPHIL # BLD AUTO: 0.1 THOUSAND/ΜL (ref 0–0.61)
EOSINOPHIL NFR BLD AUTO: 1 % (ref 0–6)
ERYTHROCYTE [DISTWIDTH] IN BLOOD BY AUTOMATED COUNT: 14.1 % (ref 11.6–15.1)
ERYTHROCYTE [SEDIMENTATION RATE] IN BLOOD: 53 MM/HOUR (ref 0–20)
GFR SERPL CREATININE-BSD FRML MDRD: 50 ML/MIN/1.73SQ M
GLUCOSE SERPL-MCNC: 96 MG/DL (ref 65–140)
HCT VFR BLD AUTO: 44.6 % (ref 34.8–46.1)
HGB BLD-MCNC: 13.8 G/DL (ref 11.5–15.4)
IMM GRANULOCYTES # BLD AUTO: 0.05 THOUSAND/UL (ref 0–0.2)
IMM GRANULOCYTES NFR BLD AUTO: 0 % (ref 0–2)
LYMPHOCYTES # BLD AUTO: 1.74 THOUSANDS/ΜL (ref 0.6–4.47)
LYMPHOCYTES NFR BLD AUTO: 14 % (ref 14–44)
MCH RBC QN AUTO: 29.3 PG (ref 26.8–34.3)
MCHC RBC AUTO-ENTMCNC: 30.9 G/DL (ref 31.4–37.4)
MCV RBC AUTO: 95 FL (ref 82–98)
MONOCYTES # BLD AUTO: 1.37 THOUSAND/ΜL (ref 0.17–1.22)
MONOCYTES NFR BLD AUTO: 11 % (ref 4–12)
NEUTROPHILS # BLD AUTO: 8.87 THOUSANDS/ΜL (ref 1.85–7.62)
NEUTS SEG NFR BLD AUTO: 73 % (ref 43–75)
NRBC BLD AUTO-RTO: 0 /100 WBCS
PLATELET # BLD AUTO: 173 THOUSANDS/UL (ref 149–390)
PMV BLD AUTO: 10.8 FL (ref 8.9–12.7)
POTASSIUM SERPL-SCNC: 4.1 MMOL/L (ref 3.5–5.3)
PROT SERPL-MCNC: 6.4 G/DL (ref 6.4–8.2)
RBC # BLD AUTO: 4.71 MILLION/UL (ref 3.81–5.12)
SODIUM SERPL-SCNC: 144 MMOL/L (ref 136–145)
WBC # BLD AUTO: 12.21 THOUSAND/UL (ref 4.31–10.16)

## 2020-01-25 PROCEDURE — 85025 COMPLETE CBC W/AUTO DIFF WBC: CPT | Performed by: EMERGENCY MEDICINE

## 2020-01-25 PROCEDURE — 80053 COMPREHEN METABOLIC PANEL: CPT | Performed by: EMERGENCY MEDICINE

## 2020-01-25 PROCEDURE — 86140 C-REACTIVE PROTEIN: CPT | Performed by: EMERGENCY MEDICINE

## 2020-01-25 PROCEDURE — 85652 RBC SED RATE AUTOMATED: CPT | Performed by: EMERGENCY MEDICINE

## 2020-01-25 PROCEDURE — 99219 PR INITIAL OBSERVATION CARE/DAY 50 MINUTES: CPT | Performed by: INTERNAL MEDICINE

## 2020-01-25 PROCEDURE — 73502 X-RAY EXAM HIP UNI 2-3 VIEWS: CPT

## 2020-01-25 PROCEDURE — 99285 EMERGENCY DEPT VISIT HI MDM: CPT

## 2020-01-25 PROCEDURE — 99284 EMERGENCY DEPT VISIT MOD MDM: CPT | Performed by: EMERGENCY MEDICINE

## 2020-01-25 PROCEDURE — 36415 COLL VENOUS BLD VENIPUNCTURE: CPT | Performed by: EMERGENCY MEDICINE

## 2020-01-25 RX ORDER — PANTOPRAZOLE SODIUM 40 MG/1
40 TABLET, DELAYED RELEASE ORAL
Status: DISCONTINUED | OUTPATIENT
Start: 2020-01-26 | End: 2020-01-26 | Stop reason: HOSPADM

## 2020-01-25 RX ORDER — LIDOCAINE 50 MG/G
1 PATCH TOPICAL DAILY
Status: DISCONTINUED | OUTPATIENT
Start: 2020-01-26 | End: 2020-01-26 | Stop reason: HOSPADM

## 2020-01-25 RX ORDER — MELOXICAM 7.5 MG/1
7.5 TABLET ORAL DAILY
Status: DISCONTINUED | OUTPATIENT
Start: 2020-01-26 | End: 2020-01-25

## 2020-01-25 RX ORDER — B-COMPLEX WITH VITAMIN C
1 TABLET ORAL
Status: DISCONTINUED | OUTPATIENT
Start: 2020-01-26 | End: 2020-01-26 | Stop reason: HOSPADM

## 2020-01-25 RX ORDER — MELOXICAM 7.5 MG/1
7.5 TABLET ORAL DAILY PRN
Status: DISCONTINUED | OUTPATIENT
Start: 2020-01-25 | End: 2020-01-26

## 2020-01-25 RX ORDER — LISINOPRIL 5 MG/1
5 TABLET ORAL DAILY
Status: DISCONTINUED | OUTPATIENT
Start: 2020-01-26 | End: 2020-01-26 | Stop reason: HOSPADM

## 2020-01-25 RX ORDER — ACETAMINOPHEN 325 MG/1
975 TABLET ORAL ONCE
Status: COMPLETED | OUTPATIENT
Start: 2020-01-25 | End: 2020-01-25

## 2020-01-25 RX ORDER — AMLODIPINE BESYLATE 5 MG/1
5 TABLET ORAL DAILY
Status: DISCONTINUED | OUTPATIENT
Start: 2020-01-26 | End: 2020-01-26 | Stop reason: HOSPADM

## 2020-01-25 RX ORDER — ACETAMINOPHEN 325 MG/1
975 TABLET ORAL EVERY 8 HOURS SCHEDULED
Status: DISCONTINUED | OUTPATIENT
Start: 2020-01-25 | End: 2020-01-26 | Stop reason: HOSPADM

## 2020-01-25 RX ORDER — LANOLIN ALCOHOL/MO/W.PET/CERES
3 CREAM (GRAM) TOPICAL
Status: DISCONTINUED | OUTPATIENT
Start: 2020-01-26 | End: 2020-01-26 | Stop reason: HOSPADM

## 2020-01-25 RX ORDER — TRAVOPROST OPHTHALMIC SOLUTION 0.04 MG/ML
1 SOLUTION OPHTHALMIC
Status: DISCONTINUED | OUTPATIENT
Start: 2020-01-25 | End: 2020-01-26 | Stop reason: HOSPADM

## 2020-01-25 RX ADMIN — MELATONIN 3 MG: 3 TAB ORAL at 23:54

## 2020-01-25 RX ADMIN — ACETAMINOPHEN 975 MG: 325 TABLET ORAL at 21:46

## 2020-01-25 RX ADMIN — TRAVOPROST 1 DROP: 0.04 SOLUTION/ DROPS OPHTHALMIC at 21:46

## 2020-01-25 RX ADMIN — ACETAMINOPHEN 975 MG: 325 TABLET ORAL at 13:36

## 2020-01-25 NOTE — ED ATTENDING ATTESTATION
1/25/2020  I, Rocio Mishra MD, saw and evaluated the patient  I have discussed the patient with the resident/non-physician practitioner and agree with the resident's/non-physician practitioner's findings, Plan of Care, and MDM as documented in the resident's/non-physician practitioner's note, except where noted  All available labs and Radiology studies were reviewed  I was present for key portions of any procedure(s) performed by the resident/non-physician practitioner and I was immediately available to provide assistance  At this point I agree with the current assessment done in the Emergency Department  I have conducted an independent evaluation of this patient a history and physical is as follows:    OA: 79 y/o f with chronic osteoarthritis s/p recent L hip injection on 1/21 with worsening pain in her hip since that time  Pt unable to bear weight without significant pain that has been progressive since injection  No fevers/chills  No n/v  No cp/sob  No new rash  No fevers/chills, elderly f in NAD, VSS, NC/AT, MMM, RR, lungs CTAB, abd soft, +BS, decreased ROM L hip with mild ttp latera aspect, no erythema/warmth/swelling, intact distal pulses and capillary refill < 2 sec, AAO   A/p L hip pain s/p injection, labs and imaging, discuss with ortho, treat accordingly    ED Course         Critical Care Time  Procedures

## 2020-01-25 NOTE — ED NOTES
Patient ambulatory to bathroom with walker, steady gait      Claudell Canner, RN  01/25/20 0455 Detail Level: Simple Detail Level: Zone

## 2020-01-25 NOTE — H&P
H&P- Avtar Craig 7/9/1923, 80 y o  female MRN: [id number]    Unit/Bed#: [facility] Encounter: [id number]    Primary Care Provider: Cheng Lujan DO   Date and time admitted to hospital: 1/25/2020 11:26 AM        * Left hip pain  Assessment & Plan  Patient with acute on chronic left hip pain  Worsened after getting recent steroid injection  X-ray of the hip done  Official read pending  Patient evaluated by ortho  No concern for septic arthritis at this point even though patient's inflammatory markers elevated  No further imaging needed per ortho  Will start the patient on Tylenol 975 acute weight, lidocaine patch  Continue home meloxicam - change it to prn  Patient also takes Aleve at home  Hold for now  Patient prefers to continue taking meloxicam which she has been taking for many years  Exaplained her the risks of NSAIDS sta her [facility]  Pt's nephew who is a retired EMT understands the risks  Ambulatory dysfunction  Assessment & Plan  Secondary to left hip pain  Patient this morning unable to put any weight or walk around because of severe pain  Pt reports improvement in pain in the ER  Will admit for PT OT eval    Essential hypertension  Assessment & Plan  Initially elevated on admission most likely because of pain  Improving now that the pain is better  Continue medication amlodipine and lisinopril  VTE Prophylaxis: Enoxaparin (Lovenox)  / sequential compression device   Code Status: DNR DNI  POLST: POLST form is not discussed and not completed at this time  Discussion with family: pt and nephew    Anticipated Length of Stay:  Patient will be admitted on an Observation basis with an anticipated length of stay of  < 2 midnights  Justification for Hospital Stay: above    Total Time for Visit, including Counseling / Coordination of Care: 45 minutes  Greater than 50% of this total time spent on direct patient counseling and coordination of care      Chief Complaint:   Worsening left hip pain with significant ambulatory dysfunction    History of Present Illness:    Aylin Cedeño is a 80 y o  female who presents with acute on chronic left hip pain x 5 days after she got her a steroid injection  Patient has known history of severe osteoarthritis of the hip, follows with orthopedics patient in gets steroid injections  Her previous injections help with her pain significantly but the last injection that she got on 1/21/2020 made her pain worse  It has been progressed to a point that patient was not able to put any weight on her left leg this morning  Denies any other complaints  No fever or chills  No swelling  No fall or trauma  Patient's nephew who is her caregiver was present at bedside  In the ER patient was given 975 mg Tylenol which helped significantly with her pain  On my evaluation patient complained of pain with movements  Review of Systems:    Review of Systems   Constitutional: Positive for activity change  Negative for fatigue and fever  HENT: Negative for congestion and sore throat  Respiratory: Negative for cough and shortness of breath  Cardiovascular: Negative for chest pain and palpitations  Gastrointestinal: Negative for abdominal pain, nausea and vomiting  Genitourinary: Negative for dysuria, flank pain, frequency and urgency  Musculoskeletal: Positive for arthralgias and gait problem  Negative for back pain  Skin: Negative for color change and rash  Neurological: Negative for dizziness, weakness and light-headedness  Psychiatric/Behavioral: Negative for agitation, behavioral problems and confusion         Past Medical and Surgical History:     Past Medical History:   Diagnosis Date    Hypertension        Past Surgical History:   Procedure Laterality Date    FL INJECTION LEFT HIP (NON ARTHROGRAM)  9/11/2018    FL INJECTION LEFT HIP (NON ARTHROGRAM)  12/21/2018    FL INJECTION LEFT HIP (NON ARTHROGRAM)  3/12/2019    FL INJECTION LEFT HIP (NON ARTHROGRAM)  7/23/2019    FL INJECTION LEFT HIP (NON ARTHROGRAM)  10/16/2019    FL INJECTION LEFT HIP (NON ARTHROGRAM)  1/21/2020    HYSTERECTOMY         Meds/Allergies:    Prior to Admission medications    Medication Sig Start Date End Date Taking? Authorizing Provider   amLODIPine (NORVASC) 5 mg tablet Take 5 mg by mouth daily 7/11/18  Yes Historical Provider, MD   Calcium Citrate (CITRACAL PO) Take by mouth   Yes Historical Provider, MD   Glucos-Chond-Sterol-Fish Oil (GLUCOSAMINE CHONDROITIN PLUS) CAPS Take 1 capsule by mouth daily   Yes Historical Provider, MD   lisinopril (ZESTRIL) 5 mg tablet Take 5 mg by mouth daily 8/22/18  Yes Historical Provider, MD   meloxicam (MOBIC) 7 5 mg tablet Take 7 5 mg by mouth daily 4/2/18  Yes Historical Provider, MD   Naproxen Sodium (ALEVE PO) Take by mouth   Yes Historical Provider, MD   omeprazole (PriLOSEC) 20 mg delayed release capsule Take 20 mg by mouth daily 2/1/18  Yes Historical Provider, MD   TRAVATAN Z 0 004 % ophthalmic solution PUT 1 DROP INTO BOTH EYES AT BEDTIME 4/28/18  Yes Historical Provider, MD     I have reviewed home medications with patient personally  Allergies: Allergies   Allergen Reactions    Ampicillin        Social History:     Marital Status:     Occupation:   Patient Pre-hospital Living Situation:   Patient Pre-hospital Level of Mobility:   Patient Pre-hospital Diet Restrictions:   Substance Use History:   Social History     Substance and Sexual Activity   Alcohol Use No     Social History     Tobacco Use   Smoking Status Never Smoker   Smokeless Tobacco Never Used     Social History     Substance and Sexual Activity   Drug Use No       Family History:    Family History   Problem Relation Age of Onset    Stroke Mother     Cancer Father        Physical Exam:     Vitals:   Blood Pressure: 160/86 (01/25/20 1519)  Pulse: 85 (01/25/20 1519)  Temperature: 97 6 °F (36 4 °C) (01/25/20 1115)  Temp Source: Oral (01/25/20 1115)  Respirations: 20 (01/25/20 1519)  Weight - Scale: 73 kg (161 lb) (01/25/20 1115)  SpO2: 94 % (01/25/20 1519)    Physical Exam    Constitutional: Pt appears comfortable  Not in any acute distress  HENT:   Head: Normocephalic and atraumatic  Eyes: EOM are normal    Neck: Neck supple  Cardiovascular: Normal rate, regular rhythm, normal heart sounds  No murmur heard  Pulmonary/Chest: Effort normal, air entry b/l equal  No respiratory distress  Pt has no wheezes or crackles  Abdominal: Soft  Non-distended, Non-tender  Bowel sounds are normal    Musculoskeletal:  Decreased range of motion and left hip because of pain  Normal range of motion in other joints  Neurological: alert and oriented to person, place, and time  Moving all extremities spontaneously  Psychiatric: normal mood and affect  Additional Data:     Lab Results: I have personally reviewed pertinent reports  Results from last 7 days   Lab Units 01/25/20  1227   WBC Thousand/uL 12 21*   HEMOGLOBIN g/dL 13 8   HEMATOCRIT % 44 6   PLATELETS Thousands/uL 173   NEUTROS PCT % 73   LYMPHS PCT % 14   MONOS PCT % 11   EOS PCT % 1     Results from last 7 days   Lab Units 01/25/20  1425   SODIUM mmol/L 144   POTASSIUM mmol/L 4 1   CHLORIDE mmol/L 112*   CO2 mmol/L 26   BUN mg/dL 28*   CREATININE mg/dL 0 96   ANION GAP mmol/L 6   CALCIUM mg/dL 10 0   ALBUMIN g/dL 2 9*   TOTAL BILIRUBIN mg/dL 0 84   ALK PHOS U/L 101   ALT U/L 18   AST U/L 15   GLUCOSE RANDOM mg/dL 96                       Imaging: I have personally reviewed pertinent reports  XR hip/pelv 2-3 vws left    (Results Pending)       EKG, Pathology, and Other Studies Reviewed on Admission:   · EKG:     Allscripts / Epic Records Reviewed: Yes     ** Please Note: This note has been constructed using a voice recognition system   **

## 2020-01-25 NOTE — ED NOTES
Lab called to state CMP was grossly hemolyzed x 2 and requesting third redraw  Resident notified   Labs to be drawn by venipuncture      Zara José RN  01/25/20 5151

## 2020-01-25 NOTE — ED PROVIDER NOTES
History  Chief Complaint   Patient presents with    Hip Pain     Pt is having L hip pain  Pt had an injection in it on Tuesday     89 YOF with history of left hip arthritis and bursitis who presents due to left hip pain  She has been receiving injections in that hip with her most recent being 1/23  She usually has significantly improved pain after the injection, however, this time has had worsening pain  Her pain is worse than her chronic pain  She has been using aleve and meloxicam at home with no relief  She has had difficulty walking because of the pain  She has no pain at rest but has 10/10 pain when ranging the hip and ambulating  She denies fevers/chills, chest pain, SOB, rash  She has noticed mild swelling in the left leg, however, this is common for her secondary to heart failure and she typically only gets swelling in the left and not the right leg  Prior to Admission Medications   Prescriptions Last Dose Informant Patient Reported? Taking?    Calcium Citrate (CITRACAL PO)  Self Yes No   Sig: Take by mouth   Glucos-Chond-Sterol-Fish Oil (GLUCOSAMINE CHONDROITIN PLUS) CAPS  Self Yes No   Sig: Take 1 capsule by mouth daily   TRAVATAN Z 0 004 % ophthalmic solution  Self Yes No   Sig: PUT 1 DROP INTO BOTH EYES AT BEDTIME   amLODIPine (NORVASC) 5 mg tablet  Self Yes No   Sig: Take 5 mg by mouth daily   lisinopril (ZESTRIL) 5 mg tablet  Self Yes No   Sig: Take 5 mg by mouth daily   meloxicam (MOBIC) 7 5 mg tablet  Self Yes No   Sig: Take 7 5 mg by mouth daily   omeprazole (PriLOSEC) 20 mg delayed release capsule  Self Yes No   Sig: Take 20 mg by mouth daily      Facility-Administered Medications: None       Past Medical History:   Diagnosis Date    Hypertension        Past Surgical History:   Procedure Laterality Date    FL INJECTION LEFT HIP (NON ARTHROGRAM)  9/11/2018    FL INJECTION LEFT HIP (NON ARTHROGRAM)  12/21/2018    FL INJECTION LEFT HIP (NON ARTHROGRAM)  3/12/2019    FL INJECTION LEFT HIP (NON ARTHROGRAM)  7/23/2019    FL INJECTION LEFT HIP (NON ARTHROGRAM)  10/16/2019    FL INJECTION LEFT HIP (NON ARTHROGRAM)  1/21/2020    HYSTERECTOMY         Family History   Problem Relation Age of Onset    Stroke Mother     Cancer Father      I have reviewed and agree with the history as documented  Social History     Tobacco Use    Smoking status: Never Smoker    Smokeless tobacco: Never Used   Substance Use Topics    Alcohol use: No    Drug use: No        Review of Systems   Constitutional: Negative for chills and fever  Eyes: Negative for visual disturbance  Respiratory: Negative for cough, chest tightness and shortness of breath  Cardiovascular: Negative for chest pain and leg swelling  Gastrointestinal: Negative for abdominal distention, abdominal pain, diarrhea, nausea and vomiting  Genitourinary: Negative for dysuria, flank pain, frequency and urgency  Musculoskeletal: Positive for arthralgias and gait problem  Negative for back pain  Skin: Negative for pallor and rash  Neurological: Negative for syncope, weakness, light-headedness and headaches  All other systems reviewed and are negative  Physical Exam  ED Triage Vitals [01/25/20 1115]   Temperature Pulse Respirations Blood Pressure SpO2   97 6 °F (36 4 °C) 89 20 (!) 202/110 96 %      Temp Source Heart Rate Source Patient Position - Orthostatic VS BP Location FiO2 (%)   Oral Monitor Sitting Left arm --      Pain Score       5             Orthostatic Vital Signs  Vitals:    01/25/20 1115 01/25/20 1256 01/25/20 1519   BP: (!) 202/110 (!) 180/82 160/86   Pulse: 89 82 85   Patient Position - Orthostatic VS: Sitting Lying Lying       Physical Exam   Constitutional: She is oriented to person, place, and time  No distress  HENT:   Head: Normocephalic and atraumatic  Mouth/Throat: Oropharynx is clear and moist    Eyes: Conjunctivae and EOM are normal    Neck: Normal range of motion  Neck supple     Cardiovascular: Normal rate and regular rhythm  Exam reveals no gallop and no friction rub  No murmur heard  Pulmonary/Chest: Effort normal and breath sounds normal  She has no wheezes  She has no rales  Abdominal: Soft  Bowel sounds are normal  She exhibits no distension  There is no tenderness  Musculoskeletal:   No tenderness of the left hip  There is tenderness to the musculature of the left leg  She has pain with external rotation of the left hip  ROM of the left knee is intact  She has 1+ pitting edema of the left leg  Neurological: She is alert and oriented to person, place, and time  Skin: Skin is warm and dry  No rash noted  No pallor  No erythema or warmth overlying the left hip  Injection site not visible  Psychiatric: She has a normal mood and affect  Her behavior is normal    Nursing note and vitals reviewed        ED Medications  Medications   acetaminophen (TYLENOL) tablet 975 mg (975 mg Oral Given 1/25/20 1336)       Diagnostic Studies  Results Reviewed     Procedure Component Value Units Date/Time    Comprehensive metabolic panel [[id number]]  (Abnormal) Collected:  01/25/20 1425    Lab Status:  Final result Specimen:  Blood from Arm, Left Updated:  01/25/20 1530     Sodium 144 mmol/L      Potassium 4 1 mmol/L      Chloride 112 mmol/L      CO2 26 mmol/L      ANION GAP 6 mmol/L      BUN 28 mg/dL      Creatinine 0 96 mg/dL      Glucose 96 mg/dL      Calcium 10 0 mg/dL      AST 15 U/L      ALT 18 U/L      Alkaline Phosphatase 101 U/L      Total Protein 6 4 g/dL      Albumin 2 9 g/dL      Total Bilirubin 0 84 mg/dL      eGFR 50 ml/min/1 73sq m     Narrative:       Micheline guidelines for Chronic Kidney Disease (CKD):     Stage 1 with normal or high GFR (GFR > 90 mL/min/1 73 square meters)    Stage 2 Mild CKD (GFR = 60-89 mL/min/1 73 square meters)    Stage 3A Moderate CKD (GFR = 45-59 mL/min/1 73 square meters)    Stage 3B Moderate CKD (GFR = 30-44 mL/min/1 73 square meters)   Stage 4 Severe CKD (GFR = 15-29 mL/min/1 73 square meters)    Stage 5 End Stage CKD (GFR <15 mL/min/1 73 square meters)  Note: GFR calculation is accurate only with a steady state creatinine    C-reactive protein [[id number]]  (Abnormal) Collected:  01/25/20 1342    Lab Status:  Final result Specimen:  Blood from Arm, Right Updated:  01/25/20 1422     CRP >90 0 mg/L     Sedimentation rate, automated [[id number]]  (Abnormal) Collected:  01/25/20 1228    Lab Status:  Final result Specimen:  Blood from Arm, Right Updated:  01/25/20 1331     Sed Rate 53 mm/hour     CBC and differential [[id number]]  (Abnormal) Collected:  01/25/20 1227    Lab Status:  Final result Specimen:  Blood from Arm, Right Updated:  01/25/20 1235     WBC 12 21 Thousand/uL      RBC 4 71 Million/uL      Hemoglobin 13 8 g/dL      Hematocrit 44 6 %      MCV 95 fL      MCH 29 3 pg      MCHC 30 9 g/dL      RDW 14 1 %      MPV 10 8 fL      Platelets 502 Thousands/uL      nRBC 0 /100 WBCs      Neutrophils Relative 73 %      Immat GRANS % 0 %      Lymphocytes Relative 14 %      Monocytes Relative 11 %      Eosinophils Relative 1 %      Basophils Relative 1 %      Neutrophils Absolute 8 87 Thousands/µL      Immature Grans Absolute 0 05 Thousand/uL      Lymphocytes Absolute 1 74 Thousands/µL      Monocytes Absolute 1 37 Thousand/µL      Eosinophils Absolute 0 10 Thousand/µL      Basophils Absolute 0 08 Thousands/µL                  XR hip/pelv 2-3 vws left    (Results Pending)         Procedures  Procedures      ED Course  ED Course as of Jan 25 1535   Sat Jan 25, 2020   1354 Per ortho, no concern for septic arthritis  Pt clear from their standpoint for discharge home with ortho follow up  Identification of Seniors at Risk      Most Recent Value   (ISAR) Identification of Seniors at Risk   Before the illness or injury that brought you to the Emergency, did you need someone to help you on a regular basis?   0 Filed at: 01/25/2020 1118   In the last 24 hours, have you needed more help than usual?  0 Filed at: 01/25/2020 1118   Have you been hospitalized for one or more nights during the past 6 months? 0 Filed at: 01/25/2020 1118   In general, do you see well?  0 Filed at: 01/25/2020 1118   In general, do you have serious problems with your memory? 0 Filed at: 01/25/2020 1118   Do you take more than three different medications every day? 1 Filed at: 01/25/2020 1118   ISAR Score  1 Filed at: 01/25/2020 1118                          MDM  Number of Diagnoses or Management Options  Ambulatory dysfunction: new and requires workup  Elevated C-reactive protein (CRP): new and requires workup  Elevated sed rate: new and requires workup  Hip pain, left: new and requires workup  Diagnosis management comments: 80 YOF with left hip pain after injection 2 days ago  No external signs suggestive of septic joint  Will obtain XR left hip to evaluate for fracture, and ESR/CRP along with CBC and CMP  Will have ortho evaluate patient as well  XR showing arthritic changes but no acute fracture  Inflammatory markers significantly elevated warranting further evaluation  Pt admitted to [facility]  Care transferred         Amount and/or Complexity of Data Reviewed  Clinical lab tests: ordered and reviewed  Tests in the radiology section of CPT®: ordered and reviewed  Decide to obtain previous medical records or to obtain history from someone other than the patient: yes  Review and summarize past medical records: yes  Discuss the patient with other providers: yes    Risk of Complications, Morbidity, and/or Mortality  Presenting problems: moderate  Management options: moderate    Patient Progress  Patient progress: stable        Disposition  Final diagnoses:   Hip pain, left   Ambulatory dysfunction   Elevated C-reactive protein (CRP)   Elevated sed rate     Time reflects when diagnosis was documented in both MDM as applicable and the Disposition within this note     Time User Action Codes Description Comment    1/25/2020  3:25 PM Ricardo Silver Add [M25 552] Hip pain, left     1/25/2020  3:25 PM Ricardo Silver Add [R26 2] Ambulatory dysfunction     1/25/2020  3:25 PM Ricardo Silver Add [R79 82] Elevated C-reactive protein (CRP)     1/25/2020  3:25 PM Ricardo Silver Add [R70 0] Elevated sed rate       ED Disposition     ED Disposition Condition Date/Time Comment    Admit Stable Sat Jan 25, 2020  3:25 PM Case was discussed with SARA and the patient's admission status was agreed to be Admission Status: observation status to the service of Dr Emiliana Day   Follow-up Information    None         Patient's Medications   Discharge Prescriptions    No medications on file     No discharge procedures on file  ED Provider  Attending physically available and evaluated [place] Janet ORNELAS managed the patient along with the ED Attending      Electronically Signed by         Yara Jackson MD  01/25/20 9103

## 2020-01-25 NOTE — CONSULTS
Orthopedics   Papito Gauthier 80 y o  female MRN: [id number]  Unit/Bed#: X ray      Chief Complaint:   left hip pain    HPI:   80 y  o female complaining of left hip pain  Patient is well known to the orthopedic service, as she follows with Dr Sun Fernandes as an outpatient  On 1/21 patient was referred and underwent fluoroscopy guided left intra-articular hip injection  After the procedure she reported mild relief in her chronic hip pain, however starting this morning she began having worsening pain the left hip  Pain is worse with ambulation, but she was able to ambulate utilizing her walker today  No numbness or tingling to the leg, no fevers or chills  Review Of Systems:   · Skin: Normal  · Neuro: See HPI  · Musculoskeletal: See HPI  · 14 point review of systems negative except as stated above     Past Medical History:   Past Medical History:   Diagnosis Date    Hypertension        Past Surgical History:   Past Surgical History:   Procedure Laterality Date    FL INJECTION LEFT HIP (NON ARTHROGRAM)  9/11/2018    FL INJECTION LEFT HIP (NON ARTHROGRAM)  12/21/2018    FL INJECTION LEFT HIP (NON ARTHROGRAM)  3/12/2019    FL INJECTION LEFT HIP (NON ARTHROGRAM)  7/23/2019    FL INJECTION LEFT HIP (NON ARTHROGRAM)  10/16/2019    FL INJECTION LEFT HIP (NON ARTHROGRAM)  1/21/2020    HYSTERECTOMY         Family History:  Family history reviewed and non-contributory  Family History   Problem Relation Age of Onset    Stroke Mother     Cancer Father        Social History:  Social History     Socioeconomic History    Marital status:       Spouse name: None    Number of children: None    Years of education: None    Highest education level: None   Occupational History    None   Social Needs    Financial resource strain: None    Food insecurity:     Worry: None     Inability: None    Transportation needs:     Medical: None     Non-medical: None   Tobacco Use    Smoking status: Never Smoker    Smokeless tobacco: Never Used   Substance and Sexual Activity    Alcohol use: No    Drug use: No    Sexual activity: None   Lifestyle    Physical activity:     Days per week: None     Minutes per session: None    Stress: None   Relationships    Social connections:     Talks on phone: None     Gets together: None     Attends Confucianist service: None     Active member of club or organization: None     Attends meetings of clubs or organizations: None     Relationship status: None    Intimate partner violence:     Fear of current or ex partner: None     Emotionally abused: None     Physically abused: None     Forced sexual activity: None   Other Topics Concern    None   Social History Narrative    None       Allergies: Allergies   Allergen Reactions    Ampicillin            Labs:  0   Lab Value Date/Time    HCT 44 6 01/25/2020 1227    HCT 44 5 01/25/2019 0818    HCT 42 2 04/20/2018 0826    HCT 44 7 08/25/2015 0837    HCT 45 7 07/30/2014 0732    HGB 13 8 01/25/2020 1227    HGB 14 3 01/25/2019 0818    HGB 14 2 04/20/2018 0826    HGB 15 6 (H) 08/25/2015 0837    HGB 15 3 07/30/2014 0732    INR 1 00 01/25/2019 0818    WBC 12 21 (H) 01/25/2020 1227    WBC 7 19 01/25/2019 0818    WBC 4 90 04/20/2018 0826    WBC 5 98 08/25/2015 0837    WBC 6 25 07/30/2014 0732    ESR 53 (H) 01/25/2020 1228       Meds:  No current facility-administered medications for this encounter       Current Outpatient Medications:     amLODIPine (NORVASC) 5 mg tablet, Take 5 mg by mouth daily, Disp: , Rfl: 3    Calcium Citrate (CITRACAL PO), Take by mouth, Disp: , Rfl:     Glucos-Chond-Sterol-Fish Oil (GLUCOSAMINE CHONDROITIN PLUS) CAPS, Take 1 capsule by mouth daily, Disp: , Rfl:     lisinopril (ZESTRIL) 5 mg tablet, Take 5 mg by mouth daily, Disp: , Rfl: 3    meloxicam (MOBIC) 7 5 mg tablet, Take 7 5 mg by mouth daily, Disp: , Rfl: 5    omeprazole (PriLOSEC) 20 mg delayed release capsule, Take 20 mg by mouth daily, Disp: , Rfl: 3    TRAVATAN Z 0  004 % ophthalmic solution, PUT 1 DROP INTO BOTH EYES AT BEDTIME, Disp: , Rfl: 1    Blood Culture:   No results found for: BLOODCX    Wound Culture:   No results found for: WOUNDCULT    Ins and Outs:  No intake/output data recorded  Physical Exam:   BP (!) 180/82   Pulse 82   Temp 97 6 °F (36 4 °C) (Oral)   Resp 20   Wt 73 kg (161 lb)   SpO2 94%   BMI 33 65 kg/m²   Gen: Alert and oriented to person, place, time  HEENT: EOMI, eyes clear, moist mucus membranes, hearing intact  Respiratory: Bilateral chest rise  No audible wheezing found  Cardiovascular: Regular Rate and Rhythm  Abdomen: soft nontender/nondistended  Musculoskeletal: left lower extremity  · Skin pink, dry, and intact, no erythema  · Painful internal/external rotation of the hip, but no micromotion tenderness  · Sensation intact L3-S1  · Motor intact hip flexion, knee flexion/extension, ankle dorsiflexion/plantar flexion, EHL/FHL  · Patient able to actively straight leg raise without assistance  · Pain with flexion/adduction/internal rotation and axial load, positive Stinchfield  · 2+ DP pulse    Radiology:   I personally reviewed the films  X-rays of left hip shows severe degenerative changes, no acute fracture or dislocation    _*_*_*_*_*_*_*_*_*_*_*_*_*_*_*_*_*_*_*_*_*_*_*_*_*_*_*_*_*_*_*_*_*_*_*_*_*_*_*_*_*    Assessment:  89 y  o female with left hip osteoarthritis exacerbation  Despite elevated inflammatory markers, patient has a benign exam that is not concerning for septic arthritis at this point in time  The worsening degenerative changes compared to x-rays from 2018 could be a sign of avascular necrosis of the left hip or natural progression of her hip osteoarthritis  Plan:   · WBAT left lower extremity  · PT  · Pain control  · Body mass index is 33 65 kg/m²  mildly obese  Recommend behavior modifications, nutrition and physical activity    · Dispo: [id number] Bonnie Ruiz for discharge from [facility] perspective, should follow up Monday for repeat examination      Avtar Borja MD

## 2020-01-25 NOTE — PLAN OF CARE
Problem: Prexisting or High Potential for Compromised Skin Integrity  Goal: Skin integrity is maintained or improved  Description  INTERVENTIONS:  - Identify patients at risk for skin breakdown  - Assess and monitor skin integrity  - Assess and monitor nutrition and hydration status  - Monitor labs   - Assess for incontinence   - Turn and reposition patient  - Assist with mobility/ambulation  - Relieve pressure over bony prominences  - Avoid friction and shearing  - Provide appropriate hygiene as needed including keeping skin clean and dry  - Evaluate need for skin moisturizer/barrier cream  - Collaborate with interdisciplinary team   - Patient/family teaching  - Consider wound care consult   Outcome: Progressing     Problem: Potential for Falls  Goal: Patient will remain free of falls  Description  INTERVENTIONS:  - Assess patient frequently for physical needs  -  Identify cognitive and physical deficits and behaviors that affect risk of falls    -  [place] fall precautions as indicated by assessment   - Educate patient/family on patient safety including physical limitations  - Instruct patient to call for assistance with activity based on assessment  - Modify environment to reduce risk of injury  - Consider OT/PT consult to assist with strengthening/mobility  Outcome: Progressing

## 2020-01-25 NOTE — TELEPHONE ENCOUNTER
Caller: He-Son  Call Back Number: [phone]  Provider: Dr Angie Calzada   1/21/2020 FL Injection left Hip       Cite Neville Wheatley called in stating his mother is in excruciating pain this morning and can not walk  Pt was first experiencing pain on Thursday but did not mention it to Javier Campoverde until this morning      Cite Neville Wheatley was unsure how to go about his mother pain    Message was sent to on call

## 2020-01-25 NOTE — TELEPHONE ENCOUNTER
I was paged by the family member and called back to discuss  The patient has had many hip injections and this is the only time that she has had increasing pain and decreasing ability to weight bear on the hip following the injection  Though she is not having fever, I did advise that the patient present to the ER for formal evaluation by our orthopedic team to rule out septic arthritis  The family is in agreement and understands and will bring her in soon

## 2020-01-26 VITALS
HEART RATE: 86 BPM | BODY MASS INDEX: 32.46 KG/M2 | HEIGHT: 59 IN | OXYGEN SATURATION: 98 % | TEMPERATURE: 98.7 F | DIASTOLIC BLOOD PRESSURE: 72 MMHG | WEIGHT: 161 LBS | RESPIRATION RATE: 18 BRPM | SYSTOLIC BLOOD PRESSURE: 134 MMHG

## 2020-01-26 LAB
ANION GAP SERPL CALCULATED.3IONS-SCNC: 7 MMOL/L (ref 4–13)
BASOPHILS # BLD AUTO: 0.07 THOUSANDS/ΜL (ref 0–0.1)
BASOPHILS NFR BLD AUTO: 1 % (ref 0–1)
BUN SERPL-MCNC: 29 MG/DL (ref 5–25)
CALCIUM SERPL-MCNC: 9.8 MG/DL (ref 8.3–10.1)
CHLORIDE SERPL-SCNC: 114 MMOL/L (ref 100–108)
CO2 SERPL-SCNC: 24 MMOL/L (ref 21–32)
CREAT SERPL-MCNC: 0.99 MG/DL (ref 0.6–1.3)
EOSINOPHIL # BLD AUTO: 0.3 THOUSAND/ΜL (ref 0–0.61)
EOSINOPHIL NFR BLD AUTO: 3 % (ref 0–6)
ERYTHROCYTE [DISTWIDTH] IN BLOOD BY AUTOMATED COUNT: 14.1 % (ref 11.6–15.1)
GFR SERPL CREATININE-BSD FRML MDRD: 48 ML/MIN/1.73SQ M
GLUCOSE SERPL-MCNC: 100 MG/DL (ref 65–140)
HCT VFR BLD AUTO: 41.6 % (ref 34.8–46.1)
HGB BLD-MCNC: 13.5 G/DL (ref 11.5–15.4)
IMM GRANULOCYTES # BLD AUTO: 0.05 THOUSAND/UL (ref 0–0.2)
IMM GRANULOCYTES NFR BLD AUTO: 0 % (ref 0–2)
LYMPHOCYTES # BLD AUTO: 2.75 THOUSANDS/ΜL (ref 0.6–4.47)
LYMPHOCYTES NFR BLD AUTO: 23 % (ref 14–44)
MCH RBC QN AUTO: 28.7 PG (ref 26.8–34.3)
MCHC RBC AUTO-ENTMCNC: 32.5 G/DL (ref 31.4–37.4)
MCV RBC AUTO: 89 FL (ref 82–98)
MONOCYTES # BLD AUTO: 1.19 THOUSAND/ΜL (ref 0.17–1.22)
MONOCYTES NFR BLD AUTO: 10 % (ref 4–12)
NEUTROPHILS # BLD AUTO: 7.83 THOUSANDS/ΜL (ref 1.85–7.62)
NEUTS SEG NFR BLD AUTO: 63 % (ref 43–75)
NRBC BLD AUTO-RTO: 0 /100 WBCS
PLATELET # BLD AUTO: 194 THOUSANDS/UL (ref 149–390)
PMV BLD AUTO: 11 FL (ref 8.9–12.7)
POTASSIUM SERPL-SCNC: 4 MMOL/L (ref 3.5–5.3)
RBC # BLD AUTO: 4.7 MILLION/UL (ref 3.81–5.12)
SODIUM SERPL-SCNC: 145 MMOL/L (ref 136–145)
WBC # BLD AUTO: 12.19 THOUSAND/UL (ref 4.31–10.16)

## 2020-01-26 PROCEDURE — 99217 PR OBSERVATION CARE DISCHARGE MANAGEMENT: CPT | Performed by: INTERNAL MEDICINE

## 2020-01-26 PROCEDURE — 80048 BASIC METABOLIC PNL TOTAL CA: CPT | Performed by: INTERNAL MEDICINE

## 2020-01-26 PROCEDURE — 99213 OFFICE O/P EST LOW 20 MIN: CPT | Performed by: ORTHOPAEDIC SURGERY

## 2020-01-26 PROCEDURE — 85025 COMPLETE CBC W/AUTO DIFF WBC: CPT | Performed by: INTERNAL MEDICINE

## 2020-01-26 PROCEDURE — NC001 PR NO CHARGE: Performed by: ORTHOPAEDIC SURGERY

## 2020-01-26 RX ORDER — OXYCODONE HYDROCHLORIDE 5 MG/1
2.5 TABLET ORAL EVERY 4 HOURS PRN
Qty: 10 TABLET | Refills: 0 | Status: ON HOLD | OUTPATIENT
Start: 2020-01-26 | End: 2020-02-01 | Stop reason: SDUPTHER

## 2020-01-26 RX ORDER — HYDRALAZINE HYDROCHLORIDE 20 MG/ML
5 INJECTION INTRAMUSCULAR; INTRAVENOUS EVERY 6 HOURS PRN
Status: DISCONTINUED | OUTPATIENT
Start: 2020-01-26 | End: 2020-01-26 | Stop reason: HOSPADM

## 2020-01-26 RX ORDER — OXYCODONE HYDROCHLORIDE 5 MG/1
2.5 TABLET ORAL EVERY 4 HOURS PRN
Qty: 10 TABLET | Refills: 0 | Status: SHIPPED | OUTPATIENT
Start: 2020-01-26 | End: 2020-01-26 | Stop reason: SDUPTHER

## 2020-01-26 RX ORDER — TRAMADOL HYDROCHLORIDE 50 MG/1
50 TABLET ORAL EVERY 6 HOURS PRN
Status: DISCONTINUED | OUTPATIENT
Start: 2020-01-26 | End: 2020-01-26 | Stop reason: HOSPADM

## 2020-01-26 RX ORDER — CLONIDINE HYDROCHLORIDE 0.2 MG/1
0.2 TABLET ORAL ONCE
Status: COMPLETED | OUTPATIENT
Start: 2020-01-26 | End: 2020-01-26

## 2020-01-26 RX ORDER — ACETAMINOPHEN 325 MG/1
975 TABLET ORAL EVERY 8 HOURS SCHEDULED
Qty: 30 TABLET | Refills: 0
Start: 2020-01-26

## 2020-01-26 RX ORDER — HYDRALAZINE HYDROCHLORIDE 20 MG/ML
5 INJECTION INTRAMUSCULAR; INTRAVENOUS ONCE
Status: COMPLETED | OUTPATIENT
Start: 2020-01-26 | End: 2020-01-26

## 2020-01-26 RX ORDER — TRAMADOL HYDROCHLORIDE 50 MG/1
50 TABLET ORAL EVERY 6 HOURS PRN
Qty: 20 TABLET | Refills: 0 | Status: SHIPPED | OUTPATIENT
Start: 2020-01-26 | End: 2020-01-26 | Stop reason: HOSPADM

## 2020-01-26 RX ADMIN — AMLODIPINE BESYLATE 5 MG: 5 TABLET ORAL at 09:02

## 2020-01-26 RX ADMIN — TRAMADOL HYDROCHLORIDE 50 MG: 50 TABLET, FILM COATED ORAL at 00:14

## 2020-01-26 RX ADMIN — HYDRALAZINE HYDROCHLORIDE 5 MG: 20 INJECTION INTRAMUSCULAR; INTRAVENOUS at 01:28

## 2020-01-26 RX ADMIN — ENOXAPARIN SODIUM 40 MG: 40 INJECTION SUBCUTANEOUS at 09:02

## 2020-01-26 RX ADMIN — CLONIDINE HYDROCHLORIDE 0.2 MG: 0.2 TABLET ORAL at 05:17

## 2020-01-26 RX ADMIN — HYDRALAZINE HYDROCHLORIDE 5 MG: 20 INJECTION INTRAMUSCULAR; INTRAVENOUS at 00:14

## 2020-01-26 RX ADMIN — LIDOCAINE 1 PATCH: 50 PATCH TOPICAL at 09:01

## 2020-01-26 RX ADMIN — LISINOPRIL 5 MG: 5 TABLET ORAL at 09:02

## 2020-01-26 RX ADMIN — PANTOPRAZOLE SODIUM 40 MG: 40 TABLET, DELAYED RELEASE ORAL at 04:50

## 2020-01-26 RX ADMIN — Medication 1 TABLET: at 09:02

## 2020-01-26 RX ADMIN — ACETAMINOPHEN 975 MG: 325 TABLET ORAL at 04:50

## 2020-01-26 NOTE — PLAN OF CARE
Problem: Potential for Falls  Goal: Patient will remain free of falls  Description  INTERVENTIONS:  - Assess patient frequently for physical needs  -  Identify cognitive and physical deficits and behaviors that affect risk of falls    -  [place] fall precautions as indicated by assessment   - Educate patient/family on patient safety including physical limitations  - Instruct patient to call for assistance with activity based on assessment  - Modify environment to reduce risk of injury  - Consider OT/PT consult to assist with strengthening/mobility  Outcome: Progressing     Problem: Prexisting or High Potential for Compromised Skin Integrity  Goal: Skin integrity is maintained or improved  Description  INTERVENTIONS:  - Identify patients at risk for skin breakdown  - Assess and monitor skin integrity  - Assess and monitor nutrition and hydration status  - Monitor labs   - Assess for incontinence   - Turn and reposition patient  - Assist with mobility/ambulation  - Relieve pressure over bony prominences  - Avoid friction and shearing  - Provide appropriate hygiene as needed including keeping skin clean and dry  - Evaluate need for skin moisturizer/barrier cream  - Collaborate with interdisciplinary team   - Patient/family teaching  - Consider wound care consult   Outcome: Progressing

## 2020-01-26 NOTE — PROGRESS NOTES
Progress Note - Orthopedics   Gerhardt Kitten 80 y o  female MRN: [id number]  Unit/Bed#: BE -01      Subjective:    80 y  o female with L hip osteoarthritis   Pain controlled this AM      Labs:  0   Lab Value Date/Time    HCT 44 6 01/25/2020 1227    HCT 44 5 01/25/2019 0818    HCT 42 2 04/20/2018 0826    HCT 44 7 08/25/2015 0837    HCT 45 7 07/30/2014 0732    HGB 13 8 01/25/2020 1227    HGB 14 3 01/25/2019 0818    HGB 14 2 04/20/2018 0826    HGB 15 6 (H) 08/25/2015 0837    HGB 15 3 07/30/2014 0732    INR 1 00 01/25/2019 0818    WBC 12 21 (H) 01/25/2020 1227    WBC 7 19 01/25/2019 0818    WBC 4 90 04/20/2018 0826    WBC 5 98 08/25/2015 0837    WBC 6 25 07/30/2014 0732    ESR 53 (H) 01/25/2020 1228    CRP >90 0 (H) 01/25/2020 1342       Meds:    Current Facility-Administered Medications:     acetaminophen (TYLENOL) tablet 975 mg, 975 mg, Oral, Q8H [address], Rocio Hutchinson MD, 975 mg at 01/26/20 0450    amLODIPine (NORVASC) tablet 5 mg, 5 mg, Oral, Daily, Rocio Hutchinson MD    calcium carbonate-vitamin D (OSCAL-D) 500 mg-200 units per tablet 1 tablet, 1 tablet, Oral, Daily With Breakfast, Rocio Hutchinson MD    enoxaparin (LOVENOX) subcutaneous injection 40 mg, 40 mg, Subcutaneous, Daily, Rocio Hutchinson MD    hydrALAZINE (APRESOLINE) injection 5 mg, 5 mg, Intravenous, Q6H PRN, Maribell Holt PA-C, 5 mg at 01/26/20 0014    lidocaine (LIDODERM) 5 % patch 1 patch, 1 patch, Topical, Daily, Rocio Hutchinson MD    lisinopril (ZESTRIL) tablet 5 mg, 5 mg, Oral, Daily, Rocio Hutchinson MD    melatonin tablet 3 mg, 3 mg, Oral, HS, Maribell Holt PA-C, 3 mg at 01/25/20 0524    pantoprazole (PROTONIX) EC tablet 40 mg, 40 mg, Oral, Early Morning, Rocio Hutchinson MD, 40 mg at 01/26/20 0450    traMADol (ULTRAM) tablet 50 mg, 50 mg, Oral, Q6H PRN, Maribell Holt PA-C, 50 mg at 01/26/20 0014    travoprost (TRAVATAN-Z) 0 004 % ophthalmic solution 1 drop, 1 drop, Both Eyes, HARPREET, Pancho DIOP Yusef Bello MD, 1 drop at 01/25/20 2146    Blood Culture:   No results found for: BLOODCX    Wound Culture:   No results found for: WOUNDCULT    Ins and Outs:  I/O last 24 hours: In: 300 [P O :300]  Out: 425 [Urine:425]          Physical:  Vitals:    01/26/20 0620   BP: 134/72   Pulse:    Resp:    Temp:    SpO2:      Musculoskeletal: left Lower Extremity  · Skin intact no erythema  · Non TTP   · No micromotion tenderness  · Able to SLR  · SILT s/s/sp/dp/t    · +fhl/ehl, +ankle dorsi/plantar flexion  · 2+ DP pulse    Assessment:    89 y  o female with L hip OA  Continues to be afebrile and have a benign exam, so no concern for septic arthritis despite elevated inflammatory markers       Plan:  · WBAT LLE  · PT/OT  · Pain control  · DVT ppx  · Dispo: Ortho signing off    Magda Maravilla MD

## 2020-01-26 NOTE — DISCHARGE SUMMARY
Discharge- Bertha Betts 7/9/1923, 80 y o  female MRN: [id number]    Unit/Bed#: -01 Encounter: [id number]    Primary Care Provider: Lisbeth Nelson DO   Date and time admitted to hospital: 1/25/2020 11:26 AM    * Left hip pain  Assessment & Plan  · Patient with acute on chronic left hip pain  Worsened after getting recent steroid injection  · X-ray of the hip shows progressive severe degenerative arthritis of L hip, no acute osseous abnormality  · Patient evaluated by ortho  No concern for septic arthritis at this point even though patient's inflammatory markers elevated  · No further imaging needed per ortho  Recommend discharge with pain medications and f/u with Dr Ricardo Payan as outpatient   · Pain improved this AM  Cleared for discharge back to assisted living facility     Essential hypertension  Assessment & Plan  · Initially elevated on admission most likely because of pain  · Improved with additional pain control   · Continue medication amlodipine and lisinopril  Ambulatory dysfunction  Assessment & Plan  · Secondary to left hip pain  · Now improved  · Able to ambulate  · For discharge        Discharging Physician / Practitioner: Rome Olson PA-C  PCP: Lisbeth Nelson DO  Admission Date:   Admission Orders (From admission, onward)     Ordered        01/25/20 1526  Place in Observation  Once                   Discharge Date: 01/26/20    Resolved Problems  Date Reviewed: 1/26/2020    None          Consultations During Hospital Stay:  · Ortho     Procedures Performed:   · Left hip x-ray:  Severe progressive degenerative arthritis left hip, no acute osseous abnormality  · ESR:  53  · CRP: greater than 90    Significant Findings / Test Results:   · See above    Incidental Findings:   · See above     Test Results Pending at Discharge (will require follow up):    · None     Outpatient Tests Requested:  · Follow-up with ortho  · Follow with PCP    Complications:  None    Reason for Admission:  Left hip pain    Hospital Course:     Katrin Mcgowan is a 80 y o  female patient with essential hypertension and chronic arthritis of bilateral hips who originally presented to the hospital on 1/25/2020 due to left hip pain  Patient has known chronic hip pain and recently underwent fluoroscopy guided L intra-articular hip injection on 1/21  After the procedure she reported mild relief in her chronic hip pain, however starting morning of admission she began having worsening pain the left hip  Pain is worse with ambulation, but she was able to ambulate utilizing her walker today  No numbness or tingling to the leg, no fevers or chills  She had xray done which was negative for acute findings  ESR/CRP elevated  Patient was seen by orthopedics and they did not feel there was any concern for septic arthritis despite elevated markers  Patient was improved with scheduled Tylenol  They recommended discharge and f/u with Dr Brianna Blue as outpatient  She is anxious for discharge unable to return to her independent living facility  Please see above list of diagnoses and related plan for additional information  Condition at Discharge: stable     Discharge Day Visit / Exam:     Subjective:  Patient feels better today, anxious to be discharged home  Vitals: Blood Pressure: 134/72 (01/26/20 0620)  Pulse: 86 (01/26/20 0620)  Temperature: 98 7 °F (37 1 °C) (01/26/20 0620)  Temp Source: Oral (01/26/20 7068)  Respirations: 18 (01/26/20 0620)  Height: 4' 11" (149 9 cm) (01/25/20 1622)  Weight - Scale: 73 kg (161 lb) (01/25/20 1115)  SpO2: 98 % (01/26/20 0620)  Exam:   Physical Exam   Constitutional: She is oriented to person, place, and time  No distress  Cardiovascular: Normal rate and regular rhythm  Pulmonary/Chest: Effort normal and breath sounds normal  No respiratory distress  Abdominal: Soft  Bowel sounds are normal  She exhibits no distension  There is no tenderness  Musculoskeletal: She exhibits no edema  Neurological: She is alert and oriented to person, place, and time  No cranial nerve deficit  Skin: Skin is warm and dry  She is not diaphoretic  No erythema  Psychiatric: She has a normal mood and affect  Nursing note and vitals reviewed  Discussion with Family: patient  Niece at bedside  Discharge instructions/Information to patient and family:   See after visit summary for information provided to patient and family  Provisions for Follow-Up Care:  See after visit summary for information related to follow-up care and any pertinent home health orders  Disposition:     Home    For Discharges to [facility] SNF:   · Not Applicable to this Patient - Not Applicable to this Patient    Planned Readmission: no     Discharge Statement:  I spent 35 minutes discharging the patient  This time was spent on the day of discharge  I had direct contact with the patient on the day of discharge  Greater than 50% of the total time was spent examining patient, answering all patient questions, arranging and discussing plan of care with patient as well as directly providing post-discharge instructions  Additional time then spent on discharge activities  Discharge Medications:  See after visit summary for reconciled discharge medications provided to patient and family        ** Please Note: This note has been constructed using a voice recognition system **

## 2020-01-27 NOTE — TELEPHONE ENCOUNTER
Message for seen by me this morning,    Appreciate your intervention    Chart review finds that the patient was admitted from the emergency room, to the hospital    Again thank you for the information regarding this patient    ED

## 2020-01-27 NOTE — SOCIAL WORK
CM met with patient and niece Jordin at bedside to review CM role and discuss d/c plan  Pt resides at [facility]  Pt is independent with ADLs and uses a RW or cane to ambulate  Pt reports family helps with transportation  Preferred pharmacy is [facility]  Heatherazael Tong [phone] is primary contact  No history of HHC or STR  No history of MH or D/A treatment  Niece to provide transport  Pt's home pharmacy is closed on Sundays  Scripts electronically sent to [facility]  Pt's niece retrieved at d/c     CM reviewed d/c planning process including the following: identifying help at home, patient preference for d/c planning needs, Discharge Lounge, [facility] Meds to Bed program, availability of treatment team to discuss questions or concerns patient and/or family may have regarding understanding medications and recognizing signs and symptoms once discharged  CM also encouraged patient to follow up with all recommended appointments after discharge  Patient advised of importance for patient and family to participate in managing patients medical well being

## 2020-01-28 ENCOUNTER — APPOINTMENT (EMERGENCY)
Dept: RADIOLOGY | Facility: HOSPITAL | Age: 85
DRG: 554 | End: 2020-01-28
Payer: MEDICARE

## 2020-01-28 ENCOUNTER — HOSPITAL ENCOUNTER (INPATIENT)
Facility: HOSPITAL | Age: 85
LOS: 3 days | Discharge: NURSING, REHAB OR LONG-TERM CARE | DRG: 554 | End: 2020-02-01
Attending: EMERGENCY MEDICINE | Admitting: INTERNAL MEDICINE
Payer: MEDICARE

## 2020-01-28 DIAGNOSIS — R26.2 AMBULATORY DYSFUNCTION: Primary | ICD-10-CM
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
## 2020-01-28 DIAGNOSIS — M25.552 HIP PAIN, LEFT: ICD-10-CM

## 2020-01-28 DIAGNOSIS — M25.552 LEFT HIP PAIN: ICD-10-CM

## 2020-01-28 DIAGNOSIS — I10 ESSENTIAL HYPERTENSION: ICD-10-CM

## 2020-01-28 PROBLEM — E83.52 HYPERCALCEMIA: Status: ACTIVE | Noted: 2020-01-28

## 2020-01-28 LAB
ALBUMIN SERPL BCP-MCNC: 2.6 G/DL (ref 3.5–5)
ALP SERPL-CCNC: 101 U/L (ref 46–116)
ALT SERPL W P-5'-P-CCNC: 18 U/L (ref 12–78)
ANION GAP SERPL CALCULATED.3IONS-SCNC: 5 MMOL/L (ref 4–13)
AST SERPL W P-5'-P-CCNC: 14 U/L (ref 5–45)
BASOPHILS # BLD AUTO: 0.06 THOUSANDS/ΜL (ref 0–0.1)
BASOPHILS NFR BLD AUTO: 1 % (ref 0–1)
BILIRUB SERPL-MCNC: 0.69 MG/DL (ref 0.2–1)
BUN SERPL-MCNC: 34 MG/DL (ref 5–25)
CALCIUM SERPL-MCNC: 10.3 MG/DL (ref 8.3–10.1)
CHLORIDE SERPL-SCNC: 110 MMOL/L (ref 100–108)
CO2 SERPL-SCNC: 26 MMOL/L (ref 21–32)
CREAT SERPL-MCNC: 1.06 MG/DL (ref 0.6–1.3)
EOSINOPHIL # BLD AUTO: 0.07 THOUSAND/ΜL (ref 0–0.61)
EOSINOPHIL NFR BLD AUTO: 1 % (ref 0–6)
ERYTHROCYTE [DISTWIDTH] IN BLOOD BY AUTOMATED COUNT: 14.3 % (ref 11.6–15.1)
GFR SERPL CREATININE-BSD FRML MDRD: 44 ML/MIN/1.73SQ M
GLUCOSE SERPL-MCNC: 92 MG/DL (ref 65–140)
HCT VFR BLD AUTO: 41.9 % (ref 34.8–46.1)
HGB BLD-MCNC: 13.2 G/DL (ref 11.5–15.4)
IMM GRANULOCYTES # BLD AUTO: 0.05 THOUSAND/UL (ref 0–0.2)
IMM GRANULOCYTES NFR BLD AUTO: 1 % (ref 0–2)
LYMPHOCYTES # BLD AUTO: 1.64 THOUSANDS/ΜL (ref 0.6–4.47)
LYMPHOCYTES NFR BLD AUTO: 15 % (ref 14–44)
MCH RBC QN AUTO: 28.2 PG (ref 26.8–34.3)
MCHC RBC AUTO-ENTMCNC: 31.5 G/DL (ref 31.4–37.4)
MCV RBC AUTO: 90 FL (ref 82–98)
MONOCYTES # BLD AUTO: 1.42 THOUSAND/ΜL (ref 0.17–1.22)
MONOCYTES NFR BLD AUTO: 13 % (ref 4–12)
NEUTROPHILS # BLD AUTO: 7.74 THOUSANDS/ΜL (ref 1.85–7.62)
NEUTS SEG NFR BLD AUTO: 69 % (ref 43–75)
NRBC BLD AUTO-RTO: 0 /100 WBCS
PLATELET # BLD AUTO: 217 THOUSANDS/UL (ref 149–390)
PLATELET # BLD AUTO: 229 THOUSANDS/UL (ref 149–390)
PMV BLD AUTO: 10.6 FL (ref 8.9–12.7)
PMV BLD AUTO: 10.7 FL (ref 8.9–12.7)
POTASSIUM SERPL-SCNC: 4.3 MMOL/L (ref 3.5–5.3)
PROT SERPL-MCNC: 6.7 G/DL (ref 6.4–8.2)
RBC # BLD AUTO: 4.68 MILLION/UL (ref 3.81–5.12)
SODIUM SERPL-SCNC: 141 MMOL/L (ref 136–145)
WBC # BLD AUTO: 10.98 THOUSAND/UL (ref 4.31–10.16)

## 2020-01-28 PROCEDURE — 70450 CT HEAD/BRAIN W/O DYE: CPT

## 2020-01-28 PROCEDURE — 99285 EMERGENCY DEPT VISIT HI MDM: CPT | Performed by: EMERGENCY MEDICINE

## 2020-01-28 PROCEDURE — 71046 X-RAY EXAM CHEST 2 VIEWS: CPT

## 2020-01-28 PROCEDURE — 80053 COMPREHEN METABOLIC PANEL: CPT | Performed by: STUDENT IN AN ORGANIZED HEALTH CARE EDUCATION/TRAINING PROGRAM

## 2020-01-28 PROCEDURE — 85025 COMPLETE CBC W/AUTO DIFF WBC: CPT | Performed by: STUDENT IN AN ORGANIZED HEALTH CARE EDUCATION/TRAINING PROGRAM

## 2020-01-28 PROCEDURE — 99285 EMERGENCY DEPT VISIT HI MDM: CPT

## 2020-01-28 PROCEDURE — NS001 PR NO SIGNATURE OR ATTESTATION: Performed by: ORTHOPAEDIC SURGERY

## 2020-01-28 PROCEDURE — 72125 CT NECK SPINE W/O DYE: CPT

## 2020-01-28 PROCEDURE — 99220 PR INITIAL OBSERVATION CARE/DAY 70 MINUTES: CPT | Performed by: INTERNAL MEDICINE

## 2020-01-28 PROCEDURE — 1123F ACP DISCUSS/DSCN MKR DOCD: CPT | Performed by: EMERGENCY MEDICINE

## 2020-01-28 PROCEDURE — 85049 AUTOMATED PLATELET COUNT: CPT | Performed by: INTERNAL MEDICINE

## 2020-01-28 PROCEDURE — 73700 CT LOWER EXTREMITY W/O DYE: CPT

## 2020-01-28 PROCEDURE — 36415 COLL VENOUS BLD VENIPUNCTURE: CPT | Performed by: STUDENT IN AN ORGANIZED HEALTH CARE EDUCATION/TRAINING PROGRAM

## 2020-01-28 RX ORDER — PANTOPRAZOLE SODIUM 40 MG/1
40 TABLET, DELAYED RELEASE ORAL
Status: DISCONTINUED | OUTPATIENT
Start: 2020-01-29 | End: 2020-02-01 | Stop reason: HOSPADM

## 2020-01-28 RX ORDER — METOPROLOL TARTRATE 5 MG/5ML
2.5 INJECTION INTRAVENOUS EVERY 6 HOURS PRN
Status: DISCONTINUED | OUTPATIENT
Start: 2020-01-28 | End: 2020-02-01 | Stop reason: HOSPADM

## 2020-01-28 RX ORDER — LIDOCAINE 50 MG/G
1 PATCH TOPICAL DAILY
Status: DISCONTINUED | OUTPATIENT
Start: 2020-01-29 | End: 2020-02-01 | Stop reason: HOSPADM

## 2020-01-28 RX ORDER — OXYCODONE HYDROCHLORIDE 5 MG/1
5 TABLET ORAL EVERY 4 HOURS PRN
Status: DISCONTINUED | OUTPATIENT
Start: 2020-01-28 | End: 2020-02-01 | Stop reason: HOSPADM

## 2020-01-28 RX ORDER — ACETAMINOPHEN 325 MG/1
975 TABLET ORAL EVERY 8 HOURS SCHEDULED
Status: DISCONTINUED | OUTPATIENT
Start: 2020-01-28 | End: 2020-02-01 | Stop reason: HOSPADM

## 2020-01-28 RX ORDER — TRAVOPROST OPHTHALMIC SOLUTION 0.04 MG/ML
1 SOLUTION OPHTHALMIC
Status: DISCONTINUED | OUTPATIENT
Start: 2020-01-28 | End: 2020-02-01 | Stop reason: HOSPADM

## 2020-01-28 RX ORDER — ONDANSETRON 2 MG/ML
4 INJECTION INTRAMUSCULAR; INTRAVENOUS EVERY 6 HOURS PRN
Status: DISCONTINUED | OUTPATIENT
Start: 2020-01-28 | End: 2020-02-01 | Stop reason: HOSPADM

## 2020-01-28 RX ORDER — LISINOPRIL 5 MG/1
5 TABLET ORAL DAILY
Status: DISCONTINUED | OUTPATIENT
Start: 2020-01-29 | End: 2020-02-01 | Stop reason: HOSPADM

## 2020-01-28 RX ORDER — AMLODIPINE BESYLATE 5 MG/1
5 TABLET ORAL DAILY
Status: DISCONTINUED | OUTPATIENT
Start: 2020-01-29 | End: 2020-01-29

## 2020-01-28 RX ORDER — HEPARIN SODIUM 5000 [USP'U]/ML
5000 INJECTION, SOLUTION INTRAVENOUS; SUBCUTANEOUS EVERY 8 HOURS SCHEDULED
Status: DISCONTINUED | OUTPATIENT
Start: 2020-01-28 | End: 2020-02-01 | Stop reason: HOSPADM

## 2020-01-28 RX ADMIN — METOPROLOL TARTRATE 2.5 MG: 5 INJECTION, SOLUTION INTRAVENOUS at 18:25

## 2020-01-28 RX ADMIN — HEPARIN SODIUM 5000 UNITS: 5000 INJECTION INTRAVENOUS; SUBCUTANEOUS at 21:25

## 2020-01-28 RX ADMIN — TRAVOPROST 1 DROP: 0.04 SOLUTION/ DROPS OPHTHALMIC at 23:08

## 2020-01-28 RX ADMIN — ACETAMINOPHEN 975 MG: 325 TABLET ORAL at 21:25

## 2020-01-28 NOTE — ASSESSMENT & PLAN NOTE
With fall secondary to left hip pain  Continue with above treatment  PT OT eval  Patient lives alone, may need placement

## 2020-01-28 NOTE — ASSESSMENT & PLAN NOTE
Elevated on admission likely secondary to pain  Continue with home meds of Norvasc and lisinopril  Pain control  May need to up titrate blood pressure dosages if no improvement

## 2020-01-28 NOTE — ASSESSMENT & PLAN NOTE
Patient with acute on chronic left hip pain   worsened after getting recent steroid injection on 1/21/20  Patient with hip osteoarthritis  CT scan with Severe degenerative osteoarthritis of the left hip with no acute osseous abnormality  Admit for pain management  Consult orthopedics for further management

## 2020-01-28 NOTE — H&P
H&P- Maci Evans 7/9/1923, 80 y o  female MRN: [id number]    Unit/Bed#: ED 19 Encounter: [id number]    Primary Care Provider: Sherry rBown DO   Date and time admitted to hospital: 1/28/2020  2:04 PM        * Left hip pain  Assessment & Plan  Patient with acute on chronic left hip pain  worsened after getting recent steroid injection on 1/21/20  Patient with hip osteoarthritis  CT scan with Severe degenerative osteoarthritis of the left hip with no acute osseous abnormality  Admit for pain management  Consult orthopedics for further management      Ambulatory dysfunction  Assessment & Plan  With fall secondary to left hip pain  Continue with above treatment  PT OT eval  Patient lives alone, may need placement    Essential hypertension  Assessment & Plan  Elevated on admission likely secondary to pain  Continue with home meds of Norvasc and lisinopril  Pain control  May need to up titrate blood pressure dosages if no improvement    Hypercalcemia  Assessment & Plan  Hold calcium supplement  Monitor    VTE Prophylaxis: Heparin  / sequential compression device   Code Status: DNR/DNI  POLST: There is no POLST form on file for this patient (pre-hospital)  Discussion with family: no    Anticipated Length of Stay:  Patient will be admitted on an Observation basis with an anticipated length of stay of  <2 midnights  Justification for Hospital Stay:  Management of hip pain and ambulatory dysfunction    Total Time for Visit, including Counseling / Coordination of Care: 1 hour  Greater than 50% of this total time spent on direct patient counseling and coordination of care  Chief Complaint:     Left hip pain    History of Present Illness:    Maci Evans is a 80 y o  female who presents with worsening left hip pain, ambulatory dysfunction and fall at home  Patient has known history of severe osteoarthritis of the hip, follows with orthopedics and  gets steroid injections    Her previous injections help with her pain significantly however the last injection that she got on 1/21/2020 made her pain worse  Pain is progressively getting worse with limited ability to ambulate   She was hospitalized recently from 01/25 until 126 due to worsening left hip pain, she was evaluated by Orthopedics, patient with recommendation for pain control and outpatient follow-up with Dr Giselle Wheeler  She came back again today with worsening pain, she fell multiple time at home  No fever chills  No nausea vomiting or diarrhea     Review of Systems:    Review of Systems   Constitutional: Negative for chills and fever  HENT: Negative for sore throat  Respiratory: Negative for shortness of breath  Cardiovascular: Negative for chest pain, palpitations and leg swelling  Gastrointestinal: Negative for abdominal pain, blood in stool, diarrhea, nausea and vomiting  Genitourinary: Negative for dysuria  Musculoskeletal: Positive for arthralgias, back pain and gait problem  Neurological: Negative for dizziness, speech difficulty and headaches  All other systems reviewed and are negative  Past Medical and Surgical History:     Past Medical History:   Diagnosis Date    Hypertension        Past Surgical History:   Procedure Laterality Date    FL INJECTION LEFT HIP (NON ARTHROGRAM)  9/11/2018    FL INJECTION LEFT HIP (NON ARTHROGRAM)  12/21/2018    FL INJECTION LEFT HIP (NON ARTHROGRAM)  3/12/2019    FL INJECTION LEFT HIP (NON ARTHROGRAM)  7/23/2019    FL INJECTION LEFT HIP (NON ARTHROGRAM)  10/16/2019    FL INJECTION LEFT HIP (NON ARTHROGRAM)  1/21/2020    HYSTERECTOMY         Meds/Allergies:    Prior to Admission medications    Medication Sig Start Date End Date Taking?  Authorizing Provider   acetaminophen (TYLENOL) 325 mg tablet Take 3 tablets (975 mg total) by mouth every 8 (eight) hours 1/26/20   Emmanuel Mendez PA-C   amLODIPine (NORVASC) 5 mg tablet Take 5 mg by mouth daily 7/11/18   Historical Provider, MD   Calcium Citrate (CITRACAL PO) Take by mouth    Historical Provider, MD   Glucos-Chond-Sterol-Fish Oil (GLUCOSAMINE CHONDROITIN PLUS) CAPS Take 1 capsule by mouth daily    Historical Provider, MD   lisinopril (ZESTRIL) 5 mg tablet Take 5 mg by mouth daily 8/22/18   Historical Provider, MD   meloxicam (MOBIC) 7 5 mg tablet Take 7 5 mg by mouth daily 4/2/18   Historical Provider, MD   omeprazole (PriLOSEC) 20 mg delayed release capsule Take 20 mg by mouth daily 2/1/18   Historical Provider, MD   oxyCODONE (ROXICODONE) 5 mg immediate release tablet Take 0 5 tablets (2 5 mg total) by mouth every 4 (four) hours as needed for moderate pain for up to 10 daysMax Daily Amount: 15 mg 1/26/20 2/5/20  Ely Liu PA-C   TRAVATAN Z 0 004 % ophthalmic solution PUT 1 DROP INTO BOTH EYES AT BEDTIME 4/28/18   Historical Provider, MD ORNELAS have reviewed home medications with patient personally  Allergies: Allergies   Allergen Reactions    Ampicillin        Social History:     Marital Status:    Patient lives alone  Substance Use History:   Social History     Substance and Sexual Activity   Alcohol Use Never    Frequency: Never    Drinks per session: Patient refused    Binge frequency: Never     Social History     Tobacco Use   Smoking Status Never Smoker   Smokeless Tobacco Never Used     Social History     Substance and Sexual Activity   Drug Use No       Family History:    non-contributory    Physical Exam:     Vitals:   Blood Pressure: (!) 188/81 (01/28/20 1500)  Pulse: 94 (01/28/20 1500)  Temperature: 99 2 °F (37 3 °C) (01/28/20 1405)  Temp Source: Oral (01/28/20 1405)  Respirations: 18 (01/28/20 1500)  SpO2: 94 % (01/28/20 1500)    Physical Exam   Constitutional: She is oriented to person, place, and time  She appears well-developed  No distress  HENT:   Head: Normocephalic and atraumatic  Mouth/Throat: No oropharyngeal exudate  Eyes: Conjunctivae and EOM are normal  No scleral icterus  Neck: Normal range of motion  Neck supple  Cardiovascular: Normal rate, regular rhythm, normal heart sounds and intact distal pulses  No murmur heard  Pulmonary/Chest: Effort normal and breath sounds normal  No respiratory distress  She has no wheezes  Abdominal: Soft  Bowel sounds are normal  She exhibits no distension  There is no tenderness  There is no rebound  Musculoskeletal: She exhibits no edema or tenderness  Decrease left hip range of motion due to pain   Neurological: She is alert and oriented to person, place, and time  No cranial nerve deficit  Skin: Skin is warm and dry  No rash noted  Psychiatric: She has a normal mood and affect  Additional Data:     Lab Results: I have personally reviewed pertinent reports  Results from last 7 days   Lab Units 01/28/20  1604   WBC Thousand/uL 10 98*   HEMOGLOBIN g/dL 13 2   HEMATOCRIT % 41 9   PLATELETS Thousands/uL 229   NEUTROS PCT % 69   LYMPHS PCT % 15   MONOS PCT % 13*   EOS PCT % 1     Results from last 7 days   Lab Units 01/28/20  1604   SODIUM mmol/L 141   POTASSIUM mmol/L 4 3   CHLORIDE mmol/L 110*   CO2 mmol/L 26   BUN mg/dL 34*   CREATININE mg/dL 1 06   ANION GAP mmol/L 5   CALCIUM mg/dL 10 3*   ALBUMIN g/dL 2 6*   TOTAL BILIRUBIN mg/dL 0 69   ALK PHOS U/L 101   ALT U/L 18   AST U/L 14   GLUCOSE RANDOM mg/dL 92                       Imaging: I have personally reviewed pertinent reports  XR chest 2 views   ED Interpretation by Dayanara Gutierres MD (01/28 1550)   No acute cardiopulmonary disease  Final Result by Jae Minor DO (01/28 1641)      No acute cardiopulmonary disease  Workstation performed: [id number]         CT head without contrast   Final Result by Laura Ko DO (01/28 1615)      No acute intracranial abnormality  Microangiopathic changes                    Workstation performed: [id number]         CT cervical spine without contrast   Final Result by Laura Ko DO (01/28 1618)      No cervical spine fracture or traumatic malalignment  Workstation performed: [id number]         CT lower extremity wo contrast left   Final Result by Donato Meza DO (01/28 1620)      Severe degenerative osteoarthritis of the left hip with no acute osseous abnormality  Workstation performed: [id number]             EKG, Pathology, and Other Studies Reviewed on Admission:   · yes    Allscripts / Epic Records Reviewed: Yes     ** Please Note: This note has been constructed using a voice recognition system   **

## 2020-01-28 NOTE — ED PROVIDER NOTES
History  Chief Complaint   Patient presents with    Multiple Falls     pt with increased falls today     This is a 59-year-old female with a past medical history of chronic left hip pain for which she receives left greater trochanteric bursa injections every 90 days along with fluoroscopic guided intra-articular injections in the left hip  The most recent injection was on the twenty-third of this month  She states that she typically has relief of pain after the injections but this last one cause worsening pain  She came to the emergency department on the twenty-fifth and was admitted to the hospital for observation for possible septic arthritis  Patient was seen by orthopedic surgery and did not believe it was septic arthritis or septic bursitis at that time and the patient was discharged home with oxycodone 2 5 mg q i d   Patient states that her pain slowly got worse over the next few days and got to the point today where she ended up falling twice  She states that she did strike her head but she is not on any blood thinners and did not lose consciousness  Patient is currently only complaining of left hip pain and left leg pain  Prior to Admission Medications   Prescriptions Last Dose Informant Patient Reported? Taking?    Calcium Citrate (CITRACAL PO) 1/28/2020 at Unknown time Self Yes Yes   Sig: Take by mouth   Glucos-Chond-Sterol-Fish Oil (GLUCOSAMINE CHONDROITIN PLUS) CAPS 1/28/2020 at Unknown time Self Yes Yes   Sig: Take 1 capsule by mouth daily   TRAVATAN Z 0 004 % ophthalmic solution 1/27/2020 at Unknown time Self Yes Yes   Sig: PUT 1 DROP INTO BOTH EYES AT BEDTIME   acetaminophen (TYLENOL) 325 mg tablet 1/28/2020 at Unknown time  No Yes   Sig: Take 3 tablets (975 mg total) by mouth every 8 (eight) hours   amLODIPine (NORVASC) 5 mg tablet 1/28/2020 at Unknown time Self Yes Yes   Sig: Take 5 mg by mouth daily   lisinopril (ZESTRIL) 5 mg tablet 1/28/2020 at Unknown time Self Yes Yes   Sig: Take 5 mg by mouth daily   meloxicam (MOBIC) 7 5 mg tablet 1/28/2020 at Unknown time Self Yes Yes   Sig: Take 7 5 mg by mouth daily   omeprazole (PriLOSEC) 20 mg delayed release capsule 1/28/2020 at Unknown time Self Yes Yes   Sig: Take 20 mg by mouth daily   oxyCODONE (ROXICODONE) 5 mg immediate release tablet 1/28/2020 at Unknown time  No Yes   Sig: Take 0 5 tablets (2 5 mg total) by mouth every 4 (four) hours as needed for moderate pain for up to 10 daysMax Daily Amount: 15 mg      Facility-Administered Medications: None       Past Medical History:   Diagnosis Date    Hypertension        Past Surgical History:   Procedure Laterality Date    FL INJECTION LEFT HIP (NON ARTHROGRAM)  9/11/2018    FL INJECTION LEFT HIP (NON ARTHROGRAM)  12/21/2018    FL INJECTION LEFT HIP (NON ARTHROGRAM)  3/12/2019    FL INJECTION LEFT HIP (NON ARTHROGRAM)  7/23/2019    FL INJECTION LEFT HIP (NON ARTHROGRAM)  10/16/2019    FL INJECTION LEFT HIP (NON ARTHROGRAM)  1/21/2020    HYSTERECTOMY         Family History   Problem Relation Age of Onset    Stroke Mother     Cancer Father      I have reviewed and agree with the history as documented  Social History     Tobacco Use    Smoking status: Never Smoker    Smokeless tobacco: Never Used   Substance Use Topics    Alcohol use: Never     Frequency: Never     Drinks per session: Patient refused     Binge frequency: Never    Drug use: No        Review of Systems   Constitutional: Negative for chills, fatigue and fever  HENT: Negative for congestion, rhinorrhea, sinus pressure and sore throat  Eyes: Negative for visual disturbance  Respiratory: Negative for cough and shortness of breath  Cardiovascular: Negative for chest pain  Gastrointestinal: Negative for abdominal pain, constipation, diarrhea, nausea and vomiting  Genitourinary: Negative for dysuria, frequency, hematuria and urgency  Musculoskeletal: Positive for arthralgias and gait problem   Negative for myalgias  Skin: Negative for color change and rash  Neurological: Negative for dizziness, light-headedness and numbness  Physical Exam  ED Triage Vitals [01/28/20 1405]   Temperature Pulse Respirations Blood Pressure SpO2   99 2 °F (37 3 °C) 98 20 (!) 203/95 93 %      Temp Source Heart Rate Source Patient Position - Orthostatic VS BP Location FiO2 (%)   Oral Monitor Lying Right arm --      Pain Score       No Pain             Orthostatic Vital Signs  Vitals:    01/31/20 1454 01/31/20 1919 01/31/20 2315 02/01/20 0649   BP: 151/93 153/94 168/94 146/78   Pulse: 104 (!) 112 (!) 109 91   Patient Position - Orthostatic VS:    Lying       Physical Exam   Constitutional: She is oriented to person, place, and time  She appears well-developed and well-nourished  No distress  HENT:   Head: Normocephalic and atraumatic  Eyes: Pupils are equal, round, and reactive to light  Conjunctivae and EOM are normal  Right eye exhibits no discharge  Left eye exhibits no discharge  No scleral icterus  Neck: Normal range of motion  Neck supple  No JVD present  Cardiovascular: Normal rate, regular rhythm and normal heart sounds  Exam reveals no gallop and no friction rub  No murmur heard  Pulmonary/Chest: Effort normal and breath sounds normal  No stridor  No respiratory distress  She has no wheezes  She has no rales  Abdominal: Soft  Bowel sounds are normal  She exhibits no distension  There is no tenderness  There is no guarding  Musculoskeletal: Normal range of motion  She exhibits tenderness (Tenderness to palpation of the left greater trochanter and some pain with external and internal rotation of the left leg)  She exhibits no edema or deformity  Neurological: She is alert and oriented to person, place, and time  No cranial nerve deficit or sensory deficit  She exhibits normal muscle tone  Skin: Skin is warm and dry  No rash noted  She is not diaphoretic  No erythema  No pallor     Psychiatric: She has a normal mood and affect  Her behavior is normal    Nursing note and vitals reviewed  ED Medications  Medications - No data to display    Diagnostic Studies  Results Reviewed     Procedure Component Value Units Date/Time    Sedimentation rate, automated [[id number]]  (Abnormal) Collected:  01/29/20 0516    Lab Status:  Final result Specimen:  Blood from Arm, Right Updated:  01/29/20 0735     Sed Rate 82 mm/hour     C-reactive protein [[id number]]  (Abnormal) Collected:  01/29/20 0516    Lab Status:  Final result Specimen:  Blood from Arm, Right Updated:  01/29/20 0628     CRP >90 0 mg/L     TSH, 3rd generation [[id number]]  (Normal) Collected:  01/29/20 0516    Lab Status:  Final result Specimen:  Blood from Arm, Right Updated:  01/29/20 0628     TSH 3RD GENERATON 1 540 uIU/mL     Narrative:       Patients undergoing fluorescein dye angiography may retain small amounts of fluorescein in the body for 48-72 hours post procedure  Samples containing fluorescein can produce falsely depressed TSH values  If the patient had this procedure,a specimen should be resubmitted post fluorescein clearance        CBC and differential [[id number]]  (Abnormal) Collected:  01/29/20 0516    Lab Status:  Final result Specimen:  Blood from Arm, Right Updated:  01/29/20 0546     WBC 8 39 Thousand/uL      RBC 4 26 Million/uL      Hemoglobin 12 1 g/dL      Hematocrit 37 7 %      MCV 89 fL      MCH 28 4 pg      MCHC 32 1 g/dL      RDW 14 2 %      MPV 10 5 fL      Platelets 696 Thousands/uL      nRBC 0 /100 WBCs      Neutrophils Relative 60 %      Immat GRANS % 1 %      Lymphocytes Relative 22 %      Monocytes Relative 14 %      Eosinophils Relative 2 %      Basophils Relative 1 %      Neutrophils Absolute 5 03 Thousands/µL      Immature Grans Absolute 0 04 Thousand/uL      Lymphocytes Absolute 1 84 Thousands/µL      Monocytes Absolute 1 21 Thousand/µL      Eosinophils Absolute 0 20 Thousand/µL      Basophils Absolute 0 07 Thousands/µL Comprehensive metabolic panel [[id number]]  (Abnormal) Collected:  01/28/20 1604    Lab Status:  Final result Specimen:  Blood from Arm, Left Updated:  01/28/20 1636     Sodium 141 mmol/L      Potassium 4 3 mmol/L      Chloride 110 mmol/L      CO2 26 mmol/L      ANION GAP 5 mmol/L      BUN 34 mg/dL      Creatinine 1 06 mg/dL      Glucose 92 mg/dL      Calcium 10 3 mg/dL      AST 14 U/L      ALT 18 U/L      Alkaline Phosphatase 101 U/L      Total Protein 6 7 g/dL      Albumin 2 6 g/dL      Total Bilirubin 0 69 mg/dL      eGFR 44 ml/min/1 73sq m     Narrative:       [place] guidelines for Chronic Kidney Disease (CKD):     Stage 1 with normal or high GFR (GFR > 90 mL/min/1 73 square meters)    Stage 2 Mild CKD (GFR = 60-89 mL/min/1 73 square meters)    Stage 3A Moderate CKD (GFR = 45-59 mL/min/1 73 square meters)    Stage 3B Moderate CKD (GFR = 30-44 mL/min/1 73 square meters)    Stage 4 Severe CKD (GFR = 15-29 mL/min/1 73 square meters)    Stage 5 End Stage CKD (GFR <15 mL/min/1 73 square meters)  Note: GFR calculation is accurate only with a steady state creatinine    CBC and differential [[id number]]  (Abnormal) Collected:  01/28/20 1604    Lab Status:  Final result Specimen:  Blood from Arm, Left Updated:  01/28/20 1618     WBC 10 98 Thousand/uL      RBC 4 68 Million/uL      Hemoglobin 13 2 g/dL      Hematocrit 41 9 %      MCV 90 fL      MCH 28 2 pg      MCHC 31 5 g/dL      RDW 14 3 %      MPV 10 7 fL      Platelets 078 Thousands/uL      nRBC 0 /100 WBCs      Neutrophils Relative 69 %      Immat GRANS % 1 %      Lymphocytes Relative 15 %      Monocytes Relative 13 %      Eosinophils Relative 1 %      Basophils Relative 1 %      Neutrophils Absolute 7 74 Thousands/µL      Immature Grans Absolute 0 05 Thousand/uL      Lymphocytes Absolute 1 64 Thousands/µL      Monocytes Absolute 1 42 Thousand/µL      Eosinophils Absolute 0 07 Thousand/µL      Basophils Absolute 0 06 Thousands/µL XR chest 2 views   ED Interpretation by Radha Sheldon MD (01/28 1550)   No acute cardiopulmonary disease  Final Result by Joseph Hatch DO (01/28 1641)      No acute cardiopulmonary disease  Workstation performed: [id number]         CT head without contrast   Final Result by Alaina Leigh DO (01/28 1615)      No acute intracranial abnormality  Microangiopathic changes  Workstation performed: [id number]         CT cervical spine without contrast   Final Result by Alaina Leigh DO (01/28 1618)      No cervical spine fracture or traumatic malalignment  Workstation performed: [id number]         CT lower extremity wo contrast left   Final Result by Alaina Leigh DO (01/28 1620)      Severe degenerative osteoarthritis of the left hip with no acute osseous abnormality  Workstation performed: [id number]               Procedures  Procedures      ED Course           Identification of Seniors at Risk      Most Recent Value   (ISAR) Identification of Seniors at Risk   Before the illness or injury that brought you to the Emergency, did you need someone to help you on a regular basis? 0 Filed at: 01/28/2020 1407   In the last 24 hours, have you needed more help than usual?  1 Filed at: 01/28/2020 1407   Have you been hospitalized for one or more nights during the past 6 months? 1 Filed at: 01/28/2020 1407   In general, do you see well? 1 Filed at: 01/28/2020 1407   In general, do you have serious problems with your memory? 0 Filed at: 01/28/2020 1407   Do you take more than three different medications every day? 1 Filed at: 01/28/2020 1407   ISAR Score  4 Filed at: 01/28/2020 1407                          MDM  Number of Diagnoses or Management Options  Ambulatory dysfunction:   Fall, initial encounter:   Diagnosis management comments: CT head neck were unremarkable    A CT scan of her left hip did not reveal any acute fracture but did show severe osteoarthritis  Will admit the patient to the hospital for further management of her pain and a physical therapy and occupational therapy evaluation as she has had multiple falls at home and is not safe to return home on her own  Disposition  Final diagnoses:   Ambulatory dysfunction   Fall, initial encounter     Time reflects when diagnosis was documented in both MDM as applicable and the Disposition within this note     Time User Action Codes Description Comment    1/28/2020  5:18 PM Favian Rosales Add [R26 2] Ambulatory dysfunction     1/28/2020  5:18 PM Alejandra Ghosh [id number]  Fracnes Weeks Fall, initial encounter     1/28/2020  6:02 PM Jose De Jesus Arroyo Add [L33 087] Left hip pain     2/1/2020 10:39 AM Loco Pals B Add [M25 552] Hip pain, left     2/1/2020 10:40 AM Loco Pals B Add [I10] Essential hypertension       ED Disposition     ED Disposition Condition Date/Time Comment    Admit Stable Tue Jan 28, 2020  5:18 PM Case was discussed with SARA and the patient's admission status was agreed to be Admission Status: observation status to the service of Dr Lazarus Calamity MD Documentation      Most Recent Value   Accepting Facility Name, [facility]      RN Documentation      Most [address] [facility] Name, [facility]      Follow-up Information     Follow up With Specialties Details Why Contact Info    Kennedy Romero MD Orthopedic Surgery Follow up  [address]      Sandra Antonio,  Family Medicine Follow up in 1 week(s)  [address] [address]  [phone]            Discharge Medication List as of 2/1/2020 10:59 AM      START taking these medications    Details   lidocaine (LIDODERM) 5 % Apply 2 patches topically daily Remove & Discard patch within 12 hours or as directed by MD, Starting Sun 2/2/2020, Until Tue 3/3/2020, No Print         CONTINUE these medications which have CHANGED    Details   amLODIPine (NORVASC) 10 mg tablet Take 1 tablet (10 mg total) by mouth daily, Starting Sun 2/2/2020, No Print      oxyCODONE (ROXICODONE) 5 mg immediate release tablet Take 0 5 tablets (2 5 mg total) by mouth every 4 (four) hours as needed for moderate pain for up to 5 daysMax Daily Amount: 15 mg, Starting Sat 2/1/2020, Until Thu 2/6/2020, Print         CONTINUE these medications which have NOT CHANGED    Details   acetaminophen (TYLENOL) 325 mg tablet Take 3 tablets (975 mg total) by mouth every 8 (eight) hours, Starting Sun 1/26/2020, No Print      Glucos-Chond-Sterol-Fish Oil (GLUCOSAMINE CHONDROITIN PLUS) CAPS Take 1 capsule by mouth daily, Historical Med      lisinopril (ZESTRIL) 5 mg tablet Take 5 mg by mouth daily, Starting Wed 8/22/2018, Historical Med      omeprazole (PriLOSEC) 20 mg delayed release capsule Take 20 mg by mouth daily, Starting Thu 2/1/2018, Historical Med      TRAVATAN Z 0 004 % ophthalmic solution PUT 1 DROP INTO BOTH EYES AT BEDTIME, Historical Med         STOP taking these medications       Calcium Citrate (CITRACAL PO) Comments:   Reason for Stopping:         meloxicam (MOBIC) 7 5 mg tablet Comments:   Reason for Stopping:             Outpatient Discharge Orders   NEGRA Pathway:  Medications to avoid: phosphate or magnesium based laxatives, NSAIDs     NEGRA Pathway: Maintain SBP between 120-140 mm/Hg     NEGRA Pathway: Call Nursing Home MD for decreased oral intake     NEGRA Pathway: Daily Weights     NEGRA Pathway: Strict I&O     NEGRA Pathway: Follow up bloodwork       ED Provider  Attending physically available and evaluated Papito Gauthier I managed the patient along with the ED Attending      Electronically Signed by         Pedro Rincon MD  02/01/20 4553

## 2020-01-29 LAB
ANION GAP SERPL CALCULATED.3IONS-SCNC: 4 MMOL/L (ref 4–13)
BASOPHILS # BLD AUTO: 0.07 THOUSANDS/ΜL (ref 0–0.1)
BASOPHILS NFR BLD AUTO: 1 % (ref 0–1)
BUN SERPL-MCNC: 28 MG/DL (ref 5–25)
CALCIUM SERPL-MCNC: 9.4 MG/DL (ref 8.3–10.1)
CHLORIDE SERPL-SCNC: 111 MMOL/L (ref 100–108)
CO2 SERPL-SCNC: 26 MMOL/L (ref 21–32)
CREAT SERPL-MCNC: 0.87 MG/DL (ref 0.6–1.3)
CRP SERPL QL: >90 MG/L
EOSINOPHIL # BLD AUTO: 0.2 THOUSAND/ΜL (ref 0–0.61)
EOSINOPHIL NFR BLD AUTO: 2 % (ref 0–6)
ERYTHROCYTE [DISTWIDTH] IN BLOOD BY AUTOMATED COUNT: 14.2 % (ref 11.6–15.1)
ERYTHROCYTE [SEDIMENTATION RATE] IN BLOOD: 82 MM/HOUR (ref 0–20)
GFR SERPL CREATININE-BSD FRML MDRD: 56 ML/MIN/1.73SQ M
GLUCOSE SERPL-MCNC: 92 MG/DL (ref 65–140)
HCT VFR BLD AUTO: 37.7 % (ref 34.8–46.1)
HGB BLD-MCNC: 12.1 G/DL (ref 11.5–15.4)
IMM GRANULOCYTES # BLD AUTO: 0.04 THOUSAND/UL (ref 0–0.2)
IMM GRANULOCYTES NFR BLD AUTO: 1 % (ref 0–2)
LYMPHOCYTES # BLD AUTO: 1.84 THOUSANDS/ΜL (ref 0.6–4.47)
LYMPHOCYTES NFR BLD AUTO: 22 % (ref 14–44)
MCH RBC QN AUTO: 28.4 PG (ref 26.8–34.3)
MCHC RBC AUTO-ENTMCNC: 32.1 G/DL (ref 31.4–37.4)
MCV RBC AUTO: 89 FL (ref 82–98)
MONOCYTES # BLD AUTO: 1.21 THOUSAND/ΜL (ref 0.17–1.22)
MONOCYTES NFR BLD AUTO: 14 % (ref 4–12)
NEUTROPHILS # BLD AUTO: 5.03 THOUSANDS/ΜL (ref 1.85–7.62)
NEUTS SEG NFR BLD AUTO: 60 % (ref 43–75)
NRBC BLD AUTO-RTO: 0 /100 WBCS
PLATELET # BLD AUTO: 212 THOUSANDS/UL (ref 149–390)
PMV BLD AUTO: 10.5 FL (ref 8.9–12.7)
POTASSIUM SERPL-SCNC: 3.8 MMOL/L (ref 3.5–5.3)
RBC # BLD AUTO: 4.26 MILLION/UL (ref 3.81–5.12)
SODIUM SERPL-SCNC: 141 MMOL/L (ref 136–145)
TSH SERPL DL<=0.05 MIU/L-ACNC: 1.54 UIU/ML (ref 0.36–3.74)
WBC # BLD AUTO: 8.39 THOUSAND/UL (ref 4.31–10.16)

## 2020-01-29 PROCEDURE — 80048 BASIC METABOLIC PNL TOTAL CA: CPT | Performed by: INTERNAL MEDICINE

## 2020-01-29 PROCEDURE — 85652 RBC SED RATE AUTOMATED: CPT | Performed by: INTERNAL MEDICINE

## 2020-01-29 PROCEDURE — 84443 ASSAY THYROID STIM HORMONE: CPT | Performed by: INTERNAL MEDICINE

## 2020-01-29 PROCEDURE — 99231 SBSQ HOSP IP/OBS SF/LOW 25: CPT | Performed by: PHYSICIAN ASSISTANT

## 2020-01-29 PROCEDURE — 86140 C-REACTIVE PROTEIN: CPT | Performed by: INTERNAL MEDICINE

## 2020-01-29 PROCEDURE — 97167 OT EVAL HIGH COMPLEX 60 MIN: CPT

## 2020-01-29 PROCEDURE — 97162 PT EVAL MOD COMPLEX 30 MIN: CPT

## 2020-01-29 PROCEDURE — 85025 COMPLETE CBC W/AUTO DIFF WBC: CPT | Performed by: INTERNAL MEDICINE

## 2020-01-29 PROCEDURE — 99232 SBSQ HOSP IP/OBS MODERATE 35: CPT | Performed by: INTERNAL MEDICINE

## 2020-01-29 RX ORDER — LANOLIN ALCOHOL/MO/W.PET/CERES
3 CREAM (GRAM) TOPICAL
Status: DISCONTINUED | OUTPATIENT
Start: 2020-01-29 | End: 2020-02-01 | Stop reason: HOSPADM

## 2020-01-29 RX ORDER — AMLODIPINE BESYLATE 10 MG/1
10 TABLET ORAL DAILY
Status: DISCONTINUED | OUTPATIENT
Start: 2020-01-30 | End: 2020-02-01 | Stop reason: HOSPADM

## 2020-01-29 RX ADMIN — ACETAMINOPHEN 975 MG: 325 TABLET ORAL at 21:10

## 2020-01-29 RX ADMIN — HEPARIN SODIUM 5000 UNITS: 5000 INJECTION INTRAVENOUS; SUBCUTANEOUS at 14:19

## 2020-01-29 RX ADMIN — LISINOPRIL 5 MG: 5 TABLET ORAL at 11:40

## 2020-01-29 RX ADMIN — HEPARIN SODIUM 5000 UNITS: 5000 INJECTION INTRAVENOUS; SUBCUTANEOUS at 21:07

## 2020-01-29 RX ADMIN — LIDOCAINE 1 PATCH: 50 PATCH TOPICAL at 11:41

## 2020-01-29 RX ADMIN — AMLODIPINE BESYLATE 5 MG: 5 TABLET ORAL at 11:39

## 2020-01-29 RX ADMIN — TRAVOPROST 1 DROP: 0.04 SOLUTION/ DROPS OPHTHALMIC at 21:07

## 2020-01-29 RX ADMIN — METOPROLOL TARTRATE 2.5 MG: 5 INJECTION, SOLUTION INTRAVENOUS at 14:22

## 2020-01-29 RX ADMIN — HEPARIN SODIUM 5000 UNITS: 5000 INJECTION INTRAVENOUS; SUBCUTANEOUS at 05:59

## 2020-01-29 RX ADMIN — PANTOPRAZOLE SODIUM 40 MG: 40 TABLET, DELAYED RELEASE ORAL at 05:59

## 2020-01-29 RX ADMIN — ACETAMINOPHEN 975 MG: 325 TABLET ORAL at 05:59

## 2020-01-29 RX ADMIN — OXYCODONE HYDROCHLORIDE 5 MG: 5 TABLET ORAL at 22:20

## 2020-01-29 RX ADMIN — ACETAMINOPHEN 975 MG: 325 TABLET ORAL at 14:16

## 2020-01-29 NOTE — DISCHARGE INSTRUCTIONS
Discharge Instructions - Shayy Knight 80 y o  female MRN: [id number]  Unit/Bed#: CW2 210-02    Weight Bearing Status:                                           Weight bearing left lower extremity    Pain:  Continue analgesics as directed    PT/OT:  Continue PT/OT on outpatient basis as directed    Appt Instructions: If you do not have your appointment, please call the clinic at [phone] to f/u with Dr Giselle Wheeler as scheduled  Otherwise followup as scheduled     Contact the office sooner if you experience any increased numbness/tingling in the extremities

## 2020-01-29 NOTE — PLAN OF CARE
Problem: Potential for Falls  Goal: Patient will remain free of falls  Description  INTERVENTIONS:  - Assess patient frequently for physical needs  -  Identify cognitive and physical deficits and behaviors that affect risk of falls  -  [place] fall precautions as indicated by assessment   - Educate patient/family on patient safety including physical limitations  - Instruct patient to call for assistance with activity based on assessment  - Modify environment to reduce risk of injury  - Consider OT/PT consult to assist with strengthening/mobility  1/29/2020 1258 by Kalpana Nick RN  Outcome: Progressing  1/29/2020 1257 by Kalpana Nick RN  Outcome: Progressing     Problem: Prexisting or High Potential for Compromised Skin Integrity  Goal: Skin integrity is maintained or improved  Description  INTERVENTIONS:  - Identify patients at risk for skin breakdown  - Assess and monitor skin integrity  - Assess and monitor nutrition and hydration status  - Monitor labs   - Assess for incontinence   - Turn and reposition patient  - Assist with mobility/ambulation  - Relieve pressure over bony prominences  - Avoid friction and shearing  - Provide appropriate hygiene as needed including keeping skin clean and dry  - Evaluate need for skin moisturizer/barrier cream  - Collaborate with interdisciplinary team   - Patient/family teaching  - Consider wound care consult   1/29/2020 1258 by Kalpana Nick RN  Outcome: Progressing  1/29/2020 1257 by Kalpana Nick RN  Outcome: Progressing     Problem: Nutrition/Hydration-ADULT  Goal: Nutrient/Hydration intake appropriate for improving, restoring or maintaining nutritional needs  Description  Monitor and assess patient's nutrition/hydration status for malnutrition  Collaborate with interdisciplinary team and initiate plan and interventions as ordered  Monitor patient's weight and dietary intake as ordered or per policy   Utilize nutrition screening tool and intervene as necessary  Determine patient's food preferences and provide high-protein, high-caloric foods as appropriate       INTERVENTIONS:  - Monitor oral intake, urinary output, labs, and treatment plans  - Assess nutrition and hydration status and recommend course of action  - Evaluate amount of meals eaten  - Assist patient with eating if necessary   - Allow adequate time for meals  - Recommend/ encourage appropriate diets, oral nutritional supplements, and vitamin/mineral supplements  - Order, calculate, and assess calorie counts as needed  - Recommend, monitor, and adjust tube feedings and TPN/PPN based on assessed needs  - Assess need for intravenous fluids  - Provide specific nutrition/hydration education as appropriate  - Include patient/family/caregiver in decisions related to nutrition  1/29/2020 1258 by Geneva Joyce RN  Outcome: Progressing  1/29/2020 1257 by Geneva Joyce RN  Outcome: Progressing     Problem: PAIN - ADULT  Goal: Verbalizes/displays adequate comfort level or baseline comfort level  Description  Interventions:  - Encourage patient to monitor pain and request assistance  - Assess pain using appropriate pain scale  - Administer analgesics based on type and severity of pain and evaluate response  - Implement non-pharmacological measures as appropriate and evaluate response  - Consider cultural and social influences on pain and pain management  - Notify physician/advanced practitioner if interventions unsuccessful or patient reports new pain  Outcome: Progressing     Problem: INFECTION - ADULT  Goal: Absence or prevention of progression during hospitalization  Description  INTERVENTIONS:  - Assess and monitor for signs and symptoms of infection  - Monitor lab/diagnostic results  - Monitor all insertion sites, i e  indwelling lines, tubes, and drains  - Monitor endotracheal if appropriate and nasal secretions for changes in amount and color  - [place] appropriate cooling/warming therapies per order  - Administer medications as ordered  - Instruct and encourage patient and family to use good hand hygiene technique  - Identify and instruct in appropriate isolation precautions for identified infection/condition  Outcome: Progressing  Goal: Absence of fever/infection during neutropenic period  Description  INTERVENTIONS:  - Monitor WBC    Outcome: Progressing     Problem: SAFETY ADULT  Goal: Maintain or return to baseline ADL function  Description  INTERVENTIONS:  -  Assess patient's ability to carry out ADLs; assess patient's baseline for ADL function and identify physical deficits which impact ability to perform ADLs (bathing, care of mouth/teeth, toileting, grooming, dressing, etc )  - Assess/evaluate cause of self-care deficits   - Assess range of motion  - Assess patient's mobility; develop plan if impaired  - Assess patient's need for assistive devices and provide as appropriate  - Encourage maximum independence but intervene and supervise when necessary  - Involve family in performance of ADLs  - Assess for home care needs following discharge   - Consider OT consult to assist with ADL evaluation and planning for discharge  - Provide patient education as appropriate  Outcome: Progressing  Goal: Maintain or return mobility status to optimal level  Description  INTERVENTIONS:  - Assess patient's baseline mobility status (ambulation, transfers, stairs, etc )    - Identify cognitive and physical deficits and behaviors that affect mobility  - Identify mobility aids required to assist with transfers and/or ambulation (gait belt, sit-to-stand, lift, walker, cane, etc )  - [place] fall precautions as indicated by assessment  - Record patient progress and toleration of activity level on Mobility SBAR; progress patient to next Phase/Stage  - Instruct patient to call for assistance with activity based on assessment  - Consider rehabilitation consult to assist with strengthening/weightbearing, etc   Outcome: Progressing     Problem: DISCHARGE PLANNING  Goal: Discharge to home or other facility with appropriate resources  Description  INTERVENTIONS:  - Identify barriers to discharge w/patient and caregiver  - Arrange for needed discharge resources and transportation as appropriate  - Identify discharge learning needs (meds, wound care, etc )  - Arrange for interpretive services to assist at discharge as needed  - Refer to Case Management Department for coordinating discharge planning if the patient needs post-hospital services based on physician/advanced practitioner order or complex needs related to functional status, cognitive ability, or social support system  Outcome: Progressing     Problem: Knowledge Deficit  Goal: Patient/family/caregiver demonstrates understanding of disease process, treatment plan, medications, and discharge instructions  Description  Complete learning assessment and assess knowledge base    Interventions:  - Provide teaching at level of understanding  - Provide teaching via preferred learning methods  Outcome: Progressing

## 2020-01-29 NOTE — PROGRESS NOTES
Progress Note - Orthopedics   Tip Harris 80 y o  female MRN: [id number]  Unit/Bed#: CW2 210-02      Subjective:    80 y  o female s/p fall with increased left hip pain  Patient had poor response to a CSI 1/21/2020 with general increase in pain since then  She was admitted for pain and ambulatory dysfunction  Today patient states that she has no pain now that she took tylenol  No acute events, no complaints    Labs:  0   Lab Value Date/Time    HCT 37 7 01/29/2020 0516    HCT 41 9 01/28/2020 1604    HCT 41 6 01/26/2020 0504    HCT 44 7 08/25/2015 0837    HCT 45 7 07/30/2014 0732    HGB 12 1 01/29/2020 0516    HGB 13 2 01/28/2020 1604    HGB 13 5 01/26/2020 0504    HGB 15 6 (H) 08/25/2015 0837    HGB 15 3 07/30/2014 0732    INR 1 00 01/25/2019 0818    WBC 8 39 01/29/2020 0516    WBC 10 98 (H) 01/28/2020 1604    WBC 12 19 (H) 01/26/2020 0504    WBC 5 98 08/25/2015 0837    WBC 6 25 07/30/2014 0732    ESR 53 (H) 01/25/2020 1228    CRP >90 0 (H) 01/25/2020 1342       Meds:    Current Facility-Administered Medications:     acetaminophen (TYLENOL) tablet 975 mg, 975 mg, Oral, Q8H [address], Nahun Crespo DO, 975 mg at 01/29/20 0559    amLODIPine (NORVASC) tablet 5 mg, 5 mg, Oral, Daily, Nahun Crespo DO    heparin (porcine) subcutaneous injection 5,000 Units, 5,000 Units, Subcutaneous, Q8H [address], 5,000 Units at 01/29/20 0559 **AND** [COMPLETED] Platelet count, , , Once, Nahun Crespo DO    lidocaine (LIDODERM) 5 % patch 1 patch, 1 patch, Topical, Daily, Nahun Crespo DO    lisinopril (ZESTRIL) tablet 5 mg, 5 mg, Oral, Daily, Nahun Crespo DO    metoprolol (LOPRESSOR) injection 2 5 mg, 2 5 mg, Intravenous, Q6H PRN, Nahun Crespo DO, 2 5 mg at 01/28/20 1825    ondansetron (ZOFRAN) injection 4 mg, 4 mg, Intravenous, Q6H PRN, Nahun Crespo DO    oxyCODONE (ROXICODONE) IR tablet 5 mg, 5 mg, Oral, Q4H PRN, Nahun Crespo DO    pantoprazole (PROTONIX) EC tablet 40 mg, 40 mg, Oral, Early Morning, Nahun Crespo DO, 40 mg at 01/29/20 0559    travoprost (TRAVATAN-Z) 0 004 % ophthalmic solution 1 drop, 1 drop, Both Eyes, HS, [facility], DO, 1 drop at 01/28/20 2308    Blood Culture:   No results found for: BLOODCX    Wound Culture:   No results found for: WOUNDCULT    Ins and Outs:  I/O last 24 hours: In: -   Out: 200 [Urine:200]          Physical:  Vitals:    01/28/20 2300   BP: 169/83   Pulse: 91   Resp:    Temp:    SpO2: 92%     Musculoskeletal: left Lower Extremity  · NTTP of hip  · Able to ROM hip without pain, although there is audible crepitus  · Sensation intact distally  · +ankle dorsi/plantar flexion  · 2+ DP pulse    Assessment:    89 y  o female with left hip osteoarthritis and ambulatory dysfunction     Plan:  · WBAT RLE  · PT/OT  · Pain control  · DVT ppx - in hospital only  · Dispo: Ortho signing off    Axel Chavarria PA-C

## 2020-01-29 NOTE — UTILIZATION REVIEW
Initial Clinical Review    Admission: Date/Time/Statement: 01/28/2020 @ 1719  Orders Placed This Encounter   Procedures    Place in Observation (expected length of stay for this patient is less than two midnights)     Standing Status:   Standing     Number of Occurrences:   1     Order Specific Question:   Admitting Physician     Answer:   Sherman Walker     Order Specific Question:   Level of Care     Answer:   Med Surg [16]     ED Arrival Information     Expected Arrival Acuity Means of Arrival Escorted By Service Admission Type    - 1/28/2020 14:04 Urgent Ambulance [facility] Hospitalist Urgent    Arrival Complaint    fall        01/28/20 1405 First Provider Evaluation of Patient [facility]     Chief Complaint   Patient presents with    Multiple Falls     pt with increased falls today     Assessment/Plan: 80year old female, presented to the ED from home via EMS  Admitted as Observation due to left hip pain  She was hospitalized recently from 01/25 until 1/26 due to worsening left hip pain, she was evaluated by Orthopedics, patient with recommendation for pain control and outpatient follow-up with Dr KAUFMAN [facility]  She came back again today with worsening pain, she fell multiple time at home  Ambulatory dysfunction  Acute on chronic left hip pain  worsened after getting recent steroid injection on 1/21/20  Patient with hip osteoarthritis  CT scan with Severe degenerative osteoarthritis of the left hip with no acute osseous abnormality  pain management  Consult orthopedics  Consult PT/OT    01/28/2020 Consult Ortho:  Left hip OA  WBAT left lower ext  PT/OT evaluation  Pain control:  possible consideration of APS consult given poor response to last CSI      OBSERVATION 01/28/2020 @ 1719, CONVERTED TO INPATIENT ADMISSION 01/29/2020 @ 440 [id number], DUE TO FURTHER DIAGNOSTIC WORKUP, REQUIRING AT LEAST 2 MIDNIGHT STAY   01/29/20 1509  Inpatient Admission Once     Transfer Service: General Medicine Question Answer Comment   Admitting Physician Champ Jones    Level of Care Med Surg    Estimated length of stay More than 2 Midnights    Certification I certify that inpatient services are medically necessary for this patient for a duration of greater than two midnights  See H&P and MD Progress Notes for additional information about the patient's course of treatment  ED Triage Vitals [01/28/20 1405]   Temperature Pulse Respirations Blood Pressure SpO2   99 2 °F (37 3 °C) 98 20 (!) 203/95 93 %      Temp Source Heart Rate Source Patient Position - Orthostatic VS BP Location FiO2 (%)   Oral Monitor Lying Right arm --      Pain Score       No Pain        Wt Readings from Last 1 Encounters:   01/25/20 73 kg (161 lb)     Additional Vital Signs:   Date/Time  Temp  Pulse  Resp  BP  MAP (mmHg)  SpO2  O2 Device  Patient Position - Orthostatic VS   01/29/20 06:54:46  97 8 °F (36 6 °C)  89  16  193/104Abnormal    134  94 %  None (Room air)  Lying   BP: nurse notified at 01/29/20 0654   01/28/20 2300  --  91  --  169/83  112  92 %  --  --   01/28/20 22:46:55  98 6 °F (37 °C)  97  18  169/83  112  96 %  --  --   01/28/20 2113  --  --  --  --  --  --  None (Room air)  --   01/28/20 2100  --  90  --  166/84  111  97 %  --  --   01/28/20 20:45:57  98 9 °F (37 2 °C)  89  17  166/84  111  95 %  --  --   01/28/20 20:45:40  98 9 °F (37 2 °C)  --  19  166/84  111  --  --  --   01/28/20 1830  --  92  18  196/92Abnormal   --  94 %  None (Room air)  Sitting   01/28/20 1815  --  106Abnormal   18  202/102Abnormal   --  92 %  None (Room air)  Sitting   01/28/20 1800  --  105  18  202/85Abnormal   --  93 %  --  --   01/28/20 1730  --  106Abnormal   18  203/137Abnormal   --  95 %  None (Room air)  Lying   01/28/20 1500  --  94  18  188/81Abnormal   --  94 %  --  --   01/28/20 1430  --  96  18  193/88Abnormal   --  95 %  --  --     01/28/2020 @ 1639  Chest X:  No acute cardiopulmonary disease      01/28/2020 @ 1614  CT head:  No acute intracranial abnormality   Microangiopathic changes  01/28/2020 @ 1616  CT C Spine:  No cervical spine fracture or traumatic malalignment  01/28/2020 @ 1618  CT lower ext / left hip:  Severe degenerative osteoarthritis of the left hip with no acute osseous abnormality  01/26/2020 @ 4927  Left hip/pel X:  Progressive severe degenerative arthritis left hip  No acute osseous abnormality      Pertinent Labs/Diagnostic Test Results:   Results from last 7 days   Lab Units 01/29/20  0516 01/28/20  2202 01/28/20  1604 01/26/20  0504 01/25/20  1227   WBC Thousand/uL 8 39  --  10 98* 12 19* 12 21*   HEMOGLOBIN g/dL 12 1  --  13 2 13 5 13 8   HEMATOCRIT % 37 7  --  41 9 41 6 44 6   PLATELETS Thousands/uL 212 217 229 194 173   NEUTROS ABS Thousands/µL 5 03  --  7 74* 7 83* 8 87*     Results from last 7 days   Lab Units 01/29/20  0516 01/28/20  1604 01/26/20  0504 01/25/20  1425   SODIUM mmol/L 141 141 145 144   POTASSIUM mmol/L 3 8 4 3 4 0 4 1   CHLORIDE mmol/L 111* 110* 114* 112*   CO2 mmol/L 26 26 24 26   ANION GAP mmol/L 4 5 7 6   BUN mg/dL 28* 34* 29* 28*   CREATININE mg/dL 0 87 1 06 0 99 0 96   EGFR ml/min/1 73sq m 56 44 48 50   CALCIUM mg/dL 9 4 10 3* 9 8 10 0     Results from last 7 days   Lab Units 01/28/20  1604 01/25/20  1425   AST U/L 14 15   ALT U/L 18 18   ALK PHOS U/L 101 101   TOTAL PROTEIN g/dL 6 7 6 4   ALBUMIN g/dL 2 6* 2 9*   TOTAL BILIRUBIN mg/dL 0 69 0 84     Results from last 7 days   Lab Units 01/29/20  0516 01/28/20  1604 01/26/20  0504 01/25/20  1425   GLUCOSE RANDOM mg/dL 92 92 100 96     Results from last 7 days   Lab Units 01/29/20  0516   TSH 3RD GENERATON uIU/mL 1 540     Results from last 7 days   Lab Units 01/29/20  0516 01/25/20  1342 01/25/20  1228   CRP mg/L >90 0* >90 0*  --    SED RATE mm/hour 82*  --  53*     ED Treatment:   Medication Administration from 01/28/2020 1404 to 01/28/2020 2042       Date/Time Order Dose Route Action     01/28/2020 1825 metoprolol (LOPRESSOR) injection 2 5 mg 2 5 mg Intravenous Given        Past Medical History:   Diagnosis Date    Hypertension      Present on Admission:   Left hip pain   Ambulatory dysfunction   Essential hypertension    Admitting Diagnosis: Injury, unspecified, initial encounter [T14 90XA]  Left hip pain [M25 552]  Ambulatory dysfunction [R26 2]  Age/Sex: 80 y o  female  Admission Orders:  Scheduled Medications:  Medications:  acetaminophen 975 mg Oral Q8H [address]   amLODIPine 5 mg Oral Daily   heparin (porcine) 5,000 Units Subcutaneous Q8H [address]   lidocaine 1 patch Topical Daily   lisinopril 5 mg Oral Daily   pantoprazole 40 mg Oral Early Morning   travoprost 1 drop Both Eyes HS   Continuous IV Infusions:   PRN Meds:  metoprolol 2 5 mg Intravenous Q6H PRN   ondansetron 4 mg Intravenous Q6H PRN   oxyCODONE 5 mg Oral Q4H PRN   Up with assistance  Yosvany SCDs  VTE Prophylaxis: Heparin  / sequential compression device   Consult PT/OT  IP CONSULT TO CASE MANAGEMENT  IP CONSULT TO ORTHOPEDIC SURGERY      Network Utilization Review Department  [email or web address]  ATTENTION: Please call with any questions or concerns to [phone] and carefully listen to the prompts so that you are directed to the right person  All voicemails are confidential   Nicki Antoine all requests for admission clinical reviews, approved or denied determinations and any other requests to dedicated fax number below belonging to the campus where the patient is receiving treatment   List of dedicated fax numbers for the Facilities:  [address] DENIALS (Administrative/Medical Necessity) [phone]   [address] (Maternity/NICU/Pediatrics) [phone]   Abida Lopez [phone]   [facility] [phone]   Radha Chao [phone]   [place] [address] [facility] [phone] [address] [facility], [facility]  [phone]   [address] [place] [address] [facility] [phone]

## 2020-01-29 NOTE — ASSESSMENT & PLAN NOTE
· Elevated on admission likely secondary to pain  · Continue with home meds of Norvasc and lisinopril  · Pain control  · BP improved initially yesterday but elevated again today    Will increase amlodipine and monitor

## 2020-01-29 NOTE — PLAN OF CARE
Problem: Potential for Falls  Goal: Patient will remain free of falls  Description  INTERVENTIONS:  - Assess patient frequently for physical needs  -  Identify cognitive and physical deficits and behaviors that affect risk of falls  -  [place] fall precautions as indicated by assessment   - Educate patient/family on patient safety including physical limitations  - Instruct patient to call for assistance with activity based on assessment  - Modify environment to reduce risk of injury  - Consider OT/PT consult to assist with strengthening/mobility  Outcome: Progressing     Problem: Prexisting or High Potential for Compromised Skin Integrity  Goal: Skin integrity is maintained or improved  Description  INTERVENTIONS:  - Identify patients at risk for skin breakdown  - Assess and monitor skin integrity  - Assess and monitor nutrition and hydration status  - Monitor labs   - Assess for incontinence   - Turn and reposition patient  - Assist with mobility/ambulation  - Relieve pressure over bony prominences  - Avoid friction and shearing  - Provide appropriate hygiene as needed including keeping skin clean and dry  - Evaluate need for skin moisturizer/barrier cream  - Collaborate with interdisciplinary team   - Patient/family teaching  - Consider wound care consult   Outcome: Progressing     Problem: Nutrition/Hydration-ADULT  Goal: Nutrient/Hydration intake appropriate for improving, restoring or maintaining nutritional needs  Description  Monitor and assess patient's nutrition/hydration status for malnutrition  Collaborate with interdisciplinary team and initiate plan and interventions as ordered  Monitor patient's weight and dietary intake as ordered or per policy  Utilize nutrition screening tool and intervene as necessary  Determine patient's food preferences and provide high-protein, high-caloric foods as appropriate       INTERVENTIONS:  - Monitor oral intake, urinary output, labs, and treatment plans  - Assess nutrition and hydration status and recommend course of action  - Evaluate amount of meals eaten  - Assist patient with eating if necessary   - Allow adequate time for meals  - Recommend/ encourage appropriate diets, oral nutritional supplements, and vitamin/mineral supplements  - Order, calculate, and assess calorie counts as needed  - Recommend, monitor, and adjust tube feedings and TPN/PPN based on assessed needs  - Assess need for intravenous fluids  - Provide specific nutrition/hydration education as appropriate  - Include patient/family/caregiver in decisions related to nutrition  Outcome: Progressing

## 2020-01-29 NOTE — ED ATTENDING ATTESTATION
1/28/2020  ILexa MD, saw and evaluated the patient  I have discussed the patient with the resident/non-physician practitioner and agree with the resident's/non-physician practitioner's findings, Plan of Care, and MDM as documented in the resident's/non-physician practitioner's note, except where noted  All available labs and Radiology studies were reviewed  I was present for key portions of any procedure(s) performed by the resident/non-physician practitioner and I was immediately available to provide assistance  At this point I agree with the current assessment done in the Emergency Department  I have conducted an independent evaluation of this patient a history and physical is as follows:    ED Course     80year-old female, presenting to the emergency department after 2 falls today  Patient has a history of chronic left leg pain  Patient states that she was attempting to get out of bed this morning, was unable to bear weight on her left leg without her giving out from underneath her  Patient believes that she might have hit her head during 1 of the falls, but denies loss of consciousness  Patient states that she has her chronic left hip pain  Ten systems reviewed negative except as noted  Elderly female lying recumbent on a stretcher in no acute distress  Head is normocephalic and atraumatic  Eyelids lashes are normal   Mucous membranes are dry  Neck is nontender  Chest is clear to auscultation bilaterally  Chest wall is tender to palpation in the midline sternum  Heart is regular rate rhythm with no murmurs rubs or gallops  Abdomen is soft and nontender  Extremities are significant for the some pain in the left hip with range of motion  No tenderness to palpation  Patient is awake, alert, oriented to person time place with 5/5 motor strength bilateral upper lower extremity      Assessment and plan:  Patient presenting to the emergency department with 2 falls today, secondary to left hip giving out  Labs Reviewed   CBC AND DIFFERENTIAL - Abnormal       Result Value Ref Range Status    WBC 10 98 (*) 4 31 - 10 16 Thousand/uL Final    RBC 4 68  3 81 - 5 12 Million/uL Final    Hemoglobin 13 2  11 5 - 15 4 g/dL Final    Hematocrit 41 9  34 8 - 46 1 % Final    MCV 90  82 - 98 fL Final    MCH 28 2  26 8 - 34 3 pg Final    MCHC 31 5  31 4 - 37 4 g/dL Final    RDW 14 3  11 6 - 15 1 % Final    MPV 10 7  8 9 - 12 7 fL Final    Platelets 980  643 - 390 Thousands/uL Final    nRBC 0  /100 WBCs Final    Neutrophils Relative 69  43 - 75 % Final    Immat GRANS % 1  0 - 2 % Final    Lymphocytes Relative 15  14 - 44 % Final    Monocytes Relative 13 (*) 4 - 12 % Final    Eosinophils Relative 1  0 - 6 % Final    Basophils Relative 1  0 - 1 % Final    Neutrophils Absolute 7 74 (*) 1 85 - 7 62 Thousands/µL Final    Immature Grans Absolute 0 05  0 00 - 0 20 Thousand/uL Final    Lymphocytes Absolute 1 64  0 60 - 4 47 Thousands/µL Final    Monocytes Absolute 1 42 (*) 0 17 - 1 22 Thousand/µL Final    Eosinophils Absolute 0 07  0 00 - 0 61 Thousand/µL Final    Basophils Absolute 0 06  0 00 - 0 10 Thousands/µL Final   COMPREHENSIVE METABOLIC PANEL - Abnormal    Sodium 141  136 - 145 mmol/L Final    Potassium 4 3  3 5 - 5 3 mmol/L Final    Chloride 110 (*) 100 - 108 mmol/L Final    CO2 26  21 - 32 mmol/L Final    ANION GAP 5  4 - 13 mmol/L Final    BUN 34 (*) 5 - 25 mg/dL Final    Creatinine 1 06  0 60 - 1 30 mg/dL Final    Comment: Standardized to IDMS reference method    Glucose 92  65 - 140 mg/dL Final    Comment:   If the patient is fasting, the ADA then defines impaired fasting glucose as > 100 mg/dL and diabetes as > or equal to 123 mg/dL  Specimen collection should occur prior to Sulfasalazine administration due to the potential for falsely depressed results  Specimen collection should occur prior to Sulfapyridine administration due to the potential for falsely elevated results      Calcium 10 3 (*) 8 3 - 10 1 mg/dL Final    AST 14  5 - 45 U/L Final    Comment:   Specimen collection should occur prior to Sulfasalazine administration due to the potential for falsely depressed results  ALT 18  12 - 78 U/L Final    Comment:   Specimen collection should occur prior to Sulfasalazine and/or Sulfapyridine administration due to the potential for falsely depressed results  Alkaline Phosphatase 101  46 - 116 U/L Final    Total Protein 6 7  6 4 - 8 2 g/dL Final    Albumin 2 6 (*) 3 5 - 5 0 g/dL Final    Total Bilirubin 0 69  0 20 - 1 00 mg/dL Final    eGFR 44  ml/min/1 73sq m Final    Narrative:     [place] guidelines for Chronic Kidney Disease (CKD):     Stage 1 with normal or high GFR (GFR > 90 mL/min/1 73 square meters)    Stage 2 Mild CKD (GFR = 60-89 mL/min/1 73 square meters)    Stage 3A Moderate CKD (GFR = 45-59 mL/min/1 73 square meters)    Stage 3B Moderate CKD (GFR = 30-44 mL/min/1 73 square meters)    Stage 4 Severe CKD (GFR = 15-29 mL/min/1 73 square meters)    Stage 5 End Stage CKD (GFR <15 mL/min/1 73 square meters)  Note: GFR calculation is accurate only with a steady state creatinine       XR chest 2 views   ED Interpretation   No acute cardiopulmonary disease  Final Result      No acute cardiopulmonary disease  Workstation performed: [id number]         CT head without contrast   Final Result      No acute intracranial abnormality  Microangiopathic changes  Workstation performed: [id number]         CT cervical spine without contrast   Final Result      No cervical spine fracture or traumatic malalignment  Workstation performed: [id number]         CT lower extremity wo contrast left   Final Result      Severe degenerative osteoarthritis of the left hip with no acute osseous abnormality           Workstation performed: [id number]               Critical Care Time  Procedures

## 2020-01-29 NOTE — PROGRESS NOTES
[address] [facility]  Progress Note - Bobby Liu 7/9/1923, 80 y o  female MRN: [id number]    Unit/Bed#: CW2 210-02 Encounter: [id number]    Primary Care Provider: Luciano Phillips DO   Date and time admitted to hospital: 1/28/2020  2:04 PM      * Left hip pain  Assessment & Plan  · Patient with acute on chronic left hip pain  worsened after getting recent steroid injection on 1/21/20  · Patient with hip osteoarthritis  · CT scan with Severe degenerative osteoarthritis of the left hip with no acute osseous abnormality  · Orthopedics consult appreciated  No further work-up at this time  Recommend PRN pain control   · Pain controlled with scheduled tylenol   · PT/OT recommendations appreciated     Hypercalcemia  Assessment & Plan  · Hold calcium supplement  · Monitor    Ambulatory dysfunction  Assessment & Plan  · With fall secondary to left hip pain  · Continue with above treatment  · PT OT eval  · Patient lives alone, she does not want to go to rehab  Interested in more assistance at home  Arranging for this  Essential hypertension  Assessment & Plan  · Elevated on admission likely secondary to pain  · Continue with home meds of Norvasc and lisinopril  · Pain control  · BP improved initially yesterday but elevated again today  Will increase amlodipine and monitor       VTE Pharmacologic Prophylaxis:   Pharmacologic: Heparin  Mechanical VTE Prophylaxis in Place: Yes    Patient Centered Rounds: I have performed bedside rounds with nursing staff today  Discussions with Specialists or Other Care Team Provider: RNBONNIE     Education and Discussions with Family / Patient: patient    Time Spent for Care: 30 minutes  More than 50% of total time spent on counseling and coordination of care as described above      Current Length of Stay: 0 day(s)    Current Patient Status: Inpatient   Certification Statement: The patient, admitted on an observation basis, will now require > 2 midnight hospital stay due to safe discharge planning given ambulatory dysfunction and PT/OT recs    Discharge Plan: when safe discharge plan established  Code Status: Level 3 - DNAR and DNI      Subjective:   Patient reports pain is significantly improved  She states she had a pain free night  Tylenol is helping  She reports she ambulated quite well with her walker with PT       Objective:     Vitals:   Temp (24hrs), Av 5 °F (36 9 °C), Min:97 8 °F (36 6 °C), Max:98 9 °F (37 2 °C)    Temp:  [97 8 °F (36 6 °C)-98 9 °F (37 2 °C)] 98 4 °F (36 9 °C)  HR:  [] 77  Resp:  [16-19] 18  BP: (166-203)/() 180/94  SpO2:  [92 %-97 %] 95 %  There is no height or weight on file to calculate BMI  Input and Output Summary (last 24 hours): Intake/Output Summary (Last 24 hours) at 2020 1518  Last data filed at 2020 1101  Gross per 24 hour   Intake 320 ml   Output 750 ml   Net -430 ml       Physical Exam:     Physical Exam   Constitutional: She is oriented to person, place, and time  No distress  Samish   HENT:   Head: Normocephalic and atraumatic  Eyes: EOM are normal  No scleral icterus  Neck: Normal range of motion  Neck supple  Cardiovascular: Normal rate and regular rhythm  Pulmonary/Chest: Effort normal and breath sounds normal    Abdominal: Soft  Bowel sounds are normal  She exhibits no distension  There is no tenderness  Musculoskeletal: Normal range of motion  She exhibits no edema  Neurological: She is alert and oriented to person, place, and time  No cranial nerve deficit  Skin: Skin is warm and dry  She is not diaphoretic  Psychiatric: She has a normal mood and affect  Her behavior is normal    Vitals reviewed        Additional Data:     Labs:    Results from last 7 days   Lab Units 20  0516   WBC Thousand/uL 8 39   HEMOGLOBIN g/dL 12 1   HEMATOCRIT % 37 7   PLATELETS Thousands/uL 212   NEUTROS PCT % 60   LYMPHS PCT % 22   MONOS PCT % 14*   EOS PCT % 2     Results from last 7 days   Lab Units 01/29/20  0516 01/28/20  1604   SODIUM mmol/L 141 141   POTASSIUM mmol/L 3 8 4 3   CHLORIDE mmol/L 111* 110*   CO2 mmol/L 26 26   BUN mg/dL 28* 34*   CREATININE mg/dL 0 87 1 06   ANION GAP mmol/L 4 5   CALCIUM mg/dL 9 4 10 3*   ALBUMIN g/dL  --  2 6*   TOTAL BILIRUBIN mg/dL  --  0 69   ALK PHOS U/L  --  101   ALT U/L  --  18   AST U/L  --  14   GLUCOSE RANDOM mg/dL 92 92                           * I Have Reviewed All Lab Data Listed Above  * Additional Pertinent Lab Tests Reviewed: All Labs Within Last 24 Hours Reviewed    Imaging:    Imaging Reports Reviewed Today Include: none  Imaging Personally Reviewed by Myself Includes:  none    Recent Cultures (last 7 days):           Last 24 Hours Medication List:     Current Facility-Administered Medications:  acetaminophen 975 mg Oral Q8H [facility]   [START ON 1/30/2020] amLODIPine 10 mg Oral Daily Tita Galaviz PA-C   heparin (porcine) 5,000 Units Subcutaneous Q8H [facility]   lidocaine 1 patch Topical Daily [facility],    lisinopril 5 mg Oral Daily [facility],    metoprolol 2 5 mg Intravenous Q6H PRN [facility], DO   ondansetron 4 mg Intravenous Q6H PRN [facility], DO   oxyCODONE 5 mg Oral Q4H PRN [facility],    pantoprazole 40 mg Oral Early Morning [facility], DO   travoprost 1 drop Both [facility]        Today, Patient Was Seen By: Moises Melo PA-C    ** Please Note: Dictation voice to text software may have been used in the creation of this document   **

## 2020-01-29 NOTE — ASSESSMENT & PLAN NOTE
· With fall secondary to left hip pain  · Continue with above treatment  · PT OT melvi  · Patient lives alone, she does not want to go to rehab  Interested in more assistance at home  Arranging for this

## 2020-01-29 NOTE — SOCIAL WORK
CM met with pt to discuss CM role in D/C planning  Pt reported the following:    POA/LW/Emergency Contact: Trenton Giron [phone]   Level of assist with ADL's PTA: IND and uses RW or cane to ambulate  House or Apt: [address]  VNA/SNF/Rehab: None    Transportation: Nephew or Niece   Help at home: Maddi Fuentes is trying to arrange private hire caregivers and needs more time to make arrangements  Pharmacy/Rx Coverage: [facility]   Name of PCP: Cory Stoddard tx for MH/SA: None    Anticipated D/C Plan: Physician notified CM that pt refuses rehab  CM called pt's nephew, Viviana Kahn and discussed that a post acute care recommendation was made by the care team for inpt short term rehab  Discussed [facility] with nephew however nephew stated that pt will NOT go to rehab  CM reviewed PT/OT notes with nephew and discussed that pt requires assist X 1 person  Nephew reported that he is arranging private hire caregivers  CM offered a list and nephew asked that it be emailed to [email or web address]  CM sent email  Nephew is concerned about pt's blood pressure and would like pt to receive [facility]  CM passed along concerns to SLIM  CM reviewed d/c planning process including the following: identifying help at home, patient preference for d/c planning needs, Discharge Lounge, [facility] Meds to Bed program, availability of treatment team to discuss questions or concerns patient and/or family may have regarding understanding medications and recognizing signs and symptoms once discharged  CM also encouraged patient to follow up with all recommended appointments after discharge  Patient advised of importance for patient and family to participate in managing patients medical well being

## 2020-01-29 NOTE — OCCUPATIONAL THERAPY NOTE
[address] Evaluation     Patient Name: Wisam Ewing  ILSBT'G Date: 1/29/2020  Problem List  Principal Problem:    Left hip pain  Active Problems:    Essential hypertension    Ambulatory dysfunction    Hypercalcemia    Past Medical History  Past Medical History:   Diagnosis Date    Hypertension      Past Surgical History  Past Surgical History:   Procedure Laterality Date    FL INJECTION LEFT HIP (NON ARTHROGRAM)  9/11/2018    FL INJECTION LEFT HIP (NON ARTHROGRAM)  12/21/2018    FL INJECTION LEFT HIP (NON ARTHROGRAM)  3/12/2019    FL INJECTION LEFT HIP (NON ARTHROGRAM)  7/23/2019    FL INJECTION LEFT HIP (NON ARTHROGRAM)  10/16/2019    FL INJECTION LEFT HIP (NON ARTHROGRAM)  1/21/2020    HYSTERECTOMY           01/29/20 1052   Note Type   Note type Eval only   Restrictions/Precautions   Weight Bearing Precautions Per Order No   Other Precautions Fall Risk;Pain;Bed Alarm   Pain Assessment   Pain Assessment No/denies pain   Pain Score No Pain   Home Living   Type of [address] One level;Stairs to enter without rails   Bathroom Shower/Tub Tub/shower unit   Bathroom Toilet Standard   Bathroom Equipment Grab bars in shower   [facility]   Prior Function   Level of [address] with ADLs and functional mobility   Lives With Alone   Receives Help From [facility] Independent   Falls in the last 6 months 1 to 4  (4)   Vocational Retired   Lifestyle   Autonomy Pt reports being I with ADLS, IADLS and mobility with use of RW PTA  Reciprocal Relationships Pt lives alone and reports that she has local family that assists as needed      Service to Others Retired   Intrinsic Gratification Enjoys reading and watching TV    Psychosocial   Psychosocial (WDL) WDL   ADL   Where Assessed Edge of bed   Eating Assistance 7  Independent   Grooming Assistance 5  [address] [address] Minimal Assistance   LB Bathing Assistance 3  Moderate Assistance   UB Dressing Assistance 4  Minimal Assistance   LB Dressing Assistance 3  Moderate Assistance   Toileting Assistance  4  Minimal Assistance   Bed Mobility   Supine to Sit 3  Moderate assistance   Additional items Assist x 1; Increased time required;LE management   Sit to Supine 3  Moderate assistance   Additional items Assist x 1; Increased time required;LE management   Transfers   Sit to Stand 4  Minimal assistance   Additional items Assist x 1; Increased time required;Verbal cues   Stand to Sit 4  Minimal assistance   Additional items Assist x 1; Increased time required;Verbal cues   Functional Mobility   Functional Mobility 4  Minimal assistance   Additional Comments Pt demonstrated short household moblility with VC for safety and pacing throughout  Additional items Rolling walker   Balance   Static Sitting Fair +   Dynamic Sitting Fair   Static Standing Poor +   Dynamic Standing Poor   Ambulatory Poor   Activity Tolerance   Activity Tolerance Patient limited by fatigue   Medical Staff Made Aware PT [address] [facility]    Nurse Made Aware RN confirmed okay to see pt    Cognition   Overall Cognitive Status [facility]   Arousal/Participation Alert; Cooperative   Attention Attends with cues to redirect   Orientation Level Oriented X4   Memory Decreased recall of precautions   Following Commands Follows one step commands without difficulty   Comments Pt is hard of hearing but very pleasant and cooperative to work with therapy  Assessment   Limitation Decreased ADL status; Decreased endurance;Decreased high-level ADLs; Decreased self-care trans   Prognosis Good   Assessment Pt is a 80 y o  female admitted [facility] on 1/28/2020 w/ left hip pain  Comorbidities include a h/o essential hypertension, ambulatory dysfunction, hypercalcemia, and trochanteric bursitis of both hips  Pt with active OT orders and up with assistance  orders  Pt is WBAT on LLE   Pt resides alone in a [facility] story apartment with 1 RUTH  Pt was I w/  ADLS and IADLS, (-) drove (reports she just got rid of her car), & required use of RW PTA  Currently pt is mod A for bed mobility, min A for functional transfers and functional mobility, min A for UB ADLS and mod A for LB ADLS  Pt is limited at this time 2*: pain, endurance, activity tolerance, functional mobility, unsupportive home environment and decreased I w/ ADLS/IADLS  The following Occupational Performance Areas to address include: bathing/shower, toilet hygiene, dressing, functional mobility and clothing management  Based on the aforementioned OT evaluation, functional performance deficits, and assessments, pt has been identified as a high complexity evaluation  From OT standpoint, anticipate d/c STR  Pt to continue to benefit from acute immediate OT services to address the following goals 3-5x/week to  w/in 7-10 days: Johana Sequin Goals   Patient Goals To return home    LTG Time Frame 7-10   Long Term Goal #1 See goals below    Plan   Treatment Interventions ADL retraining;Functional transfer training; Endurance training;Patient/family training;Equipment evaluation/education; Compensatory technique education;Continued evaluation; Energy conservation; Activityengagement   Goal Expiration Date 20   OT Frequency 3-5x/wk   Recommendation   OT Discharge Recommendation Short Term Rehab   OT - OK to Discharge Yes  (When medically appropriate )   Modified Cleveland Scale   Modified Cleveland Scale 4     GOALS    1) Pt will increase activity tolerance to G for 30 min txment sessions    2) Pt will complete UB/LB dressing/self care w/ mod I using adaptive device and DME as needed    3) Pt will complete bathing w/ Mod I w/ use of AE and DME as needed    4) Pt will complete toileting w/ mod I w/ G hygiene/thoroughness using DME as needed    5) Pt will improve functional transfers to Mod I on/off all surfaces using DME as needed w/ G balance/safety     6) Pt will improve functional mobility during ADL/IADL/leisure tasks to Mod I using DME as needed w/ G balance/safety     7) Pt will participate in simulated IADL management task with DME as needed to increase independence to  w/ G safety and endurance    8) Pt will demonstrate G carryover of pt/caregiver education and training as appropriate      9) Pt will demonstrate 100% carryover of energy conservation techniques t/o functional I/ADL/leisure tasks w/o cues s/p skilled education    10) Pt will engage in ongoing cognitive assessment w/ G participation to assist w/ safe d/c planning/recommendations as needed    Kala Martinez, MOT, OTR/L

## 2020-01-29 NOTE — PHYSICAL THERAPY NOTE
Physical Therapy Evaluation    Patient's Name: Katrin Mcgowan    Admitting Diagnosis  Injury, unspecified, initial encounter Aileen Garrison  Left hip pain [M25 552]  Ambulatory dysfunction [R26 2]    Problem List  Patient Active Problem List   Diagnosis    Left hip pain    Trochanteric bursitis of left hip    Primary osteoarthritis of left hip    Trochanteric bursitis of both hips    Trochanteric bursitis of right hip    Essential hypertension    Ambulatory dysfunction    Hypercalcemia       Past Medical History  Past Medical History:   Diagnosis Date    Hypertension        Past Surgical History  Past Surgical History:   Procedure Laterality Date    FL INJECTION LEFT HIP (NON ARTHROGRAM)  9/11/2018    FL INJECTION LEFT HIP (NON ARTHROGRAM)  12/21/2018    FL INJECTION LEFT HIP (NON ARTHROGRAM)  3/12/2019    FL INJECTION LEFT HIP (NON ARTHROGRAM)  7/23/2019    FL INJECTION LEFT HIP (NON ARTHROGRAM)  10/16/2019    FL INJECTION LEFT HIP (NON ARTHROGRAM)  1/21/2020    HYSTERECTOMY          01/29/20 1051   Note Type   Note type Eval only   Pain Assessment   Pain Assessment No/denies pain   Pain Score No Pain   Home Living   Type of [address] One level;Stairs to enter without rails  (1 RUTH)   Bathroom Shower/Tub Tub/shower unit   Bathroom Toilet Standard   Home Equipment Walker;Grab bars   Prior Function   Level of [place] Independent with ADLs and functional mobility   Lives With Alone   ADL Assistance Independent   IADLs Needs assistance   Falls in the last 6 months 1 to 4   Comments Pt reports use of RW or rollator for all mobility, 4 recent falls    Restrictions/Precautions   Weight Bearing Precautions Per Order No   Other Precautions Fall Risk;Cognitive; Bed Alarm   General   Additional Pertinent History hip OA with multiple injections    Family/Caregiver Present No   Cognition   Overall Cognitive Status WFL   Orientation Level Oriented X4   Following Commands Follows multistep commands with increased time or repetition   RLE Assessment   RLE Assessment WFL   LLE Assessment   LLE Assessment   (hip flexion 2/5, knee extension 3/5 )   Light Touch   RLE Light Touch Grossly intact   LLE Light Touch Grossly intact   Bed Mobility   Supine to Sit 3  Moderate assistance   Additional items Assist x 1; Increased time required;Verbal cues;LE management   Sit to Supine 3  Moderate assistance   Additional items Assist x 1; Increased time required;LE management;Verbal cues   Additional Comments Extra time, cues for sequencing    Transfers   Sit to Stand 4  Minimal assistance   Additional items Assist x 1; Increased time required;Verbal cues   Stand to Sit 4  Minimal assistance   Additional items Assist x 1; Increased time required;Verbal cues   Additional Comments VC's for hand placement, control of descent    Ambulation/Elevation   Gait pattern Decreased foot clearance;Decreased L stance; Excessively slow; Inconsistent lynda   Gait Assistance 4  Minimal assist   Additional items Assist x 1;Verbal cues  (VC's for proximity to RW, pacing, safety)   Assistive Device Rolling walker   Distance 60 ft   Balance   Static Sitting Fair +   Dynamic Sitting Fair   Static Standing Poor +   Dynamic Standing Poor   Ambulatory Poor   Endurance Deficit   Endurance Deficit Yes   Activity Tolerance   Activity Tolerance Patient limited by fatigue   Medical Staff Made Aware OT   Nurse Made Aware Handoff communication with RN before and after PT session, pt returned to supine with bed alarm intact    Assessment   Prognosis Good   Problem List Decreased strength;Decreased range of motion;Decreased endurance; Impaired balance;Decreased mobility; Decreased safety awareness;Pain   Assessment Pt is a 80 y o  female seen for PT evaluation s/p admit to [facility] on 1/28/2020  Pt was admitted with a primary dx of: L hip pain, fall  PT now consulted for assessment of mobility and d/c needs  Pt with Up with assistance orders  Pts current co morbidities effecting treatment include: OA, HTN  Pts current clinical presentation is Evolving (medium complexity) due to Ongoing medical management for primary dx, Increased reliance on more restrictive AD compared to baseline, Decreased activity tolerance compared to baseline, Fall risk, Cog status    Prior to admission, pt was independent with household mobility with RW, has had 4 recent falls, resides alone  Upon evaluation, pt currently is requiring modA for bed mobility; Gustavo for transfers and Gustavo for ambulation w/ RW 60 ft  Pt presents at PT eval functioning below baseline and currently w/ overall mobility deficits 2* to: BLE weakness, decreased ROM, impaired balance, decreased endurance, gait deviations, pain, decreased activity tolerance compared to baseline, decreased functional mobility tolerance compared to baseline, fall risk  Pt currently at a fall risk 2* to impairments listed above  Based on the aforementioned PT evaluation, pt will continue to benefit from skilled acute PT interventions to address stated impairments; to maximize functional mobility; for ongoing pt/ family training; and DME needs  At conclusion of PT session pt returned BTB, bed alarm engaged and all needs in reach with phone and call bell within reach  Pt denies any further questions at this time  Recommend IP rehab upon hospital D/C as pt resides alone  Barriers to Discharge Decreased caregiver support   Goals   Patient Goals "to go back home"   STG Expiration Date 02/08/20   Short Term Goal #1 In 10 days pt will be able to: 1  Demonstrate ability to perform all aspects of bed mobility independently to increase functional independence  2  Perform functional transfers independently with RW to facilitate safe return to previous living environment  3   Ambulate 150 ft with RW independently with stable vitals to improve safety with household distances and reduce fall risk    4  Climb 1 steps independently to simulate entrance to home  5  Improve LE strength grades by 1 to increase ease of functional mobility with transfers and gait  6  Pt will demonstrate improved balaance by one grade order to decrease risk of falls  PT Treatment Day 0   Plan   Treatment/Interventions LE strengthening/ROM; Functional transfer training; Therapeutic exercise; Endurance training;Bed mobility;Gait training;Patient/family training   PT Frequency Other (Comment)  (3-5x/week)   Recommendation   Recommendation Post acute IP rehab   PT - OK to Discharge Yes  (to IP rehab )   Barthel Index   Feeding 10   Bathing 0   Grooming Score 5   Dressing Score 5   Bladder Score 10   Bowels Score 10   Toilet Use Score 5   Transfers (Bed/Chair) Score 10   Mobility (Level Surface) Score 10   Stairs Score 5   Barthel Index Score 70       Robert Yang, PT, DPT

## 2020-01-29 NOTE — PLAN OF CARE
Problem: OCCUPATIONAL THERAPY ADULT  Goal: Performs self-care activities at highest level of function for planned discharge setting  See evaluation for individualized goals  Description  Treatment Interventions: ADL retraining, Functional transfer training, Endurance training, Patient/family training, Equipment evaluation/education, Compensatory technique education, Continued evaluation, Energy conservation, Activityengagement          See flowsheet documentation for full assessment, interventions and recommendations  Note:   Limitation: Decreased ADL status, Decreased endurance, Decreased high-level ADLs, Decreased self-care trans  Prognosis: Good  Assessment: Pt is a 80 y o  female admitted to [facility] on 2020 w/ left hip pain  Comorbidities include a h/o essential hypertension, ambulatory dysfunction, hypercalcemia, and trochanteric bursitis of both hips  Pt with active OT orders and up with assistance  orders  Pt is WBAT on LLE  Pt resides alone in a one story apartment with 1 RUTH  Pt was I w/  ADLS and IADLS, (-) drove (reports she just got rid of her car), & required use of RW PTA  Currently pt is mod A for bed mobility, min A for functional transfers and functional mobility, min A for UB ADLS and mod A for LB ADLS  Pt is limited at this time 2*: pain, endurance, activity tolerance, functional mobility, unsupportive home environment and decreased I w/ ADLS/IADLS  The following Occupational Performance Areas to address include: bathing/shower, toilet hygiene, dressing, functional mobility and clothing management  Based on the aforementioned OT evaluation, functional performance deficits, and assessments, pt has been identified as a high complexity evaluation  From OT standpoint, anticipate d/c STR  Pt to continue to benefit from acute immediate OT services to address the following goals 3-5x/week to  w/in 7-10 days:        OT Discharge Recommendation: Short Term Rehab  OT - OK to Discharge: Yes(When medically appropriate )

## 2020-01-29 NOTE — CONSULTS
Orthopedics   Tip Harris 80 y o  female MRN: [id number]  Unit/Bed#: LB9 210-02      Chief Complaint:   left hip pain    HPI:   80 y  o female complaining of left hip pain  Patient with known history of severe left hip OA, was recently seen and diagnosed with OA exacerbation  Patient had a fall today with further exacerbation of her left hip pain and was admitted for ambulatory dysfunction  She denies any numbness or tingling to the left leg, denies fevers or chills  Pain is worse with ambulation and relieved with rest      Review Of Systems:   · Skin: Normal  · Neuro: See HPI  · Musculoskeletal: See HPI  · 14 point review of systems negative except as stated above     Past Medical History:   Past Medical History:   Diagnosis Date    Hypertension        Past Surgical History:   Past Surgical History:   Procedure Laterality Date    FL INJECTION LEFT HIP (NON ARTHROGRAM)  9/11/2018    FL INJECTION LEFT HIP (NON ARTHROGRAM)  12/21/2018    FL INJECTION LEFT HIP (NON ARTHROGRAM)  3/12/2019    FL INJECTION LEFT HIP (NON ARTHROGRAM)  7/23/2019    FL INJECTION LEFT HIP (NON ARTHROGRAM)  10/16/2019    FL INJECTION LEFT HIP (NON ARTHROGRAM)  1/21/2020    HYSTERECTOMY         Family History:  Family history reviewed and non-contributory  Family History   Problem Relation Age of Onset    Stroke Mother     Cancer Father        Social History:  Social History     Socioeconomic History    Marital status:       Spouse name: None    Number of children: None    Years of education: None    Highest education level: None   Occupational History    None   Social Needs    Financial resource strain: None    Food insecurity:     Worry: None     Inability: None    Transportation needs:     Medical: None     Non-medical: None   Tobacco Use    Smoking status: Never Smoker    Smokeless tobacco: Never Used   Substance and Sexual Activity    Alcohol use: Never     Frequency: Never     Drinks per session: Patient refused     Binge frequency: Never    Drug use: No    Sexual activity: Never   Lifestyle    Physical activity:     Days per week: None     Minutes per session: None    Stress: None   Relationships    Social connections:     Talks on phone: None     Gets together: None     Attends Yazidism service: None     Active member of club or organization: None     Attends meetings of clubs or organizations: None     Relationship status: None    Intimate partner violence:     Fear of current or ex partner: None     Emotionally abused: None     Physically abused: None     Forced sexual activity: None   Other Topics Concern    None   Social History Narrative    None       Allergies:    Allergies   Allergen Reactions    Ampicillin            Labs:  0   Lab Value Date/Time    HCT 41 9 01/28/2020 1604    HCT 41 6 01/26/2020 0504    HCT 44 6 01/25/2020 1227    HCT 44 7 08/25/2015 0837    HCT 45 7 07/30/2014 0732    HGB 13 2 01/28/2020 1604    HGB 13 5 01/26/2020 0504    HGB 13 8 01/25/2020 1227    HGB 15 6 (H) 08/25/2015 0837    HGB 15 3 07/30/2014 0732    INR 1 00 01/25/2019 0818    WBC 10 98 (H) 01/28/2020 1604    WBC 12 19 (H) 01/26/2020 0504    WBC 12 21 (H) 01/25/2020 1227    WBC 5 98 08/25/2015 0837    WBC 6 25 07/30/2014 0732    ESR 53 (H) 01/25/2020 1228    CRP >90 0 (H) 01/25/2020 1342       Meds:    Current Facility-Administered Medications:     acetaminophen (TYLENOL) tablet 975 mg, 975 mg, Oral, Q8H [address], Anum Frazier DO, 975 mg at 01/28/20 2125    [START ON 1/29/2020] amLODIPine (NORVASC) tablet 5 mg, 5 mg, Oral, Daily, Anum Frazier DO    heparin (porcine) subcutaneous injection 5,000 Units, 5,000 Units, Subcutaneous, Q8H [address], 5,000 Units at 01/28/20 2125 **AND** [COMPLETED] Platelet count, , , Once, Anum Frazier DO    [START ON 1/29/2020] lidocaine (LIDODERM) 5 % patch 1 patch, 1 patch, Topical, Daily, Anum Frazier DO    [START ON 1/29/2020] lisinopril (ZESTRIL) tablet 5 mg, 5 mg, Oral, Daily, Colton Perkins Hema, DO    metoprolol (LOPRESSOR) injection 2 5 mg, 2 5 mg, Intravenous, Q6H PRN, Rhetta Geovanna, DO, 2 5 mg at 01/28/20 1825    ondansetron (ZOFRAN) injection 4 mg, 4 mg, Intravenous, Q6H PRN, Rhetta Geovanna, DO    oxyCODONE (ROXICODONE) IR tablet 5 mg, 5 mg, Oral, Q4H PRN, Rhetta Geovanna, DO    [START ON 1/29/2020] pantoprazole (PROTONIX) EC tablet 40 mg, 40 mg, Oral, Early Morning, Rhetta Geovanna, DO    travoprost (TRAVATAN-Z) 0 004 % ophthalmic solution 1 drop, 1 drop, Both Eyes, HS, Rhetta Geovanna, DO    Blood Culture:   No results found for: BLOODCX    Wound Culture:   No results found for: WOUNDCULT    Ins and Outs:  No intake/output data recorded  Physical Exam:   /84   Pulse 89   Temp 98 9 °F (37 2 °C)   Resp 17   SpO2 95%   Gen: Alert and oriented to person, place, time  HEENT: EOMI, eyes clear, moist mucus membranes, hearing intact  Respiratory: Bilateral chest rise  No audible wheezing found  Cardiovascular: Regular Rate and intact distal pulses   Abdomen: soft nontender/nondistended  Musculoskeletal: left lower extremity  · Skin pink, dry, and intact, no erythema  · Painful range of motion of hip joint but no micromotion tenderness  · Sensation intact L3-S1  · 4/5 motor strength to hip flexion/extension, 5/5 knee flexion/extension, ankle dorsi/plantar flexion  · Pain with flexion/adduction/internal rotation and axial load, positive Stinchfield  · Brisk capillary refill to the foot and toes     Radiology:   I personally reviewed the films  X-rays and CT of left hip shows significant degenerative changes of the left hip    _*_*_*_*_*_*_*_*_*_*_*_*_*_*_*_*_*_*_*_*_*_*_*_*_*_*_*_*_*_*_*_*_*_*_*_*_*_*_*_*_*    Assessment:  89 y  o female with left hip OA    Plan:   · WBAT left lower extremity  · PT/OT evaluation   · Pain control, possible consideration of APS consult given poor response to last CSI   · Dispo: Ok for discharge from Perla Forman MD

## 2020-01-29 NOTE — PLAN OF CARE
Problem: PHYSICAL THERAPY ADULT  Goal: Performs mobility at highest level of function for planned discharge setting  See evaluation for individualized goals  Description  Treatment/Interventions: LE strengthening/ROM, Functional transfer training, Therapeutic exercise, Endurance training, Bed mobility, Gait training, Patient/family training          See flowsheet documentation for full assessment, interventions and recommendations  Note:   Prognosis: Good  Problem List: Decreased strength, Decreased range of motion, Decreased endurance, Impaired balance, Decreased mobility, Decreased safety awareness, Pain  Assessment: Pt is a 80 y o  female seen for PT evaluation s/p admit to [facility] on 1/28/2020  Pt was admitted with a primary dx of: L hip pain, fall  PT now consulted for assessment of mobility and d/c needs  Pt with Up with assistance orders  Pts current co morbidities effecting treatment include: OA, HTN  Pts current clinical presentation is Evolving (medium complexity) due to Ongoing medical management for primary dx, Increased reliance on more restrictive AD compared to baseline, Decreased activity tolerance compared to baseline, Fall risk, Cog status    Prior to admission, pt was independent with household mobility with RW, has had 4 recent falls, resides alone  Upon evaluation, pt currently is requiring modA for bed mobility; Gustavo for transfers and Gustavo for ambulation w/ RW 60 ft  Pt presents at PT eval functioning below baseline and currently w/ overall mobility deficits 2* to: BLE weakness, decreased ROM, impaired balance, decreased endurance, gait deviations, pain, decreased activity tolerance compared to baseline, decreased functional mobility tolerance compared to baseline, fall risk  Pt currently at a fall risk 2* to impairments listed above    Based on the aforementioned PT evaluation, pt will continue to benefit from skilled acute PT interventions to address stated impairments; to maximize functional mobility; for ongoing pt/ family training; and DME needs  At conclusion of PT session pt returned BTB, bed alarm engaged and all needs in reach with phone and call bell within reach  Pt denies any further questions at this time  Recommend IP rehab upon hospital D/C as pt resides alone  Barriers to Discharge: Decreased caregiver support     Recommendation: Post acute IP rehab     PT - OK to Discharge: Yes(to IP rehab )    See flowsheet documentation for full assessment

## 2020-01-29 NOTE — ASSESSMENT & PLAN NOTE
· Patient with acute on chronic left hip pain  worsened after getting recent steroid injection on 1/21/20  · Patient with hip osteoarthritis  · CT scan with Severe degenerative osteoarthritis of the left hip with no acute osseous abnormality  · Orthopedics consult appreciated  No further work-up at this time    Recommend PRN pain control   · Pain controlled with scheduled tylenol   · PT/OT recommendations appreciated

## 2020-01-30 PROBLEM — I16.0 HYPERTENSIVE URGENCY: Status: ACTIVE | Noted: 2020-01-30

## 2020-01-30 LAB
ANION GAP SERPL CALCULATED.3IONS-SCNC: 5 MMOL/L (ref 4–13)
BUN SERPL-MCNC: 25 MG/DL (ref 5–25)
CALCIUM SERPL-MCNC: 9.5 MG/DL (ref 8.3–10.1)
CHLORIDE SERPL-SCNC: 110 MMOL/L (ref 100–108)
CO2 SERPL-SCNC: 26 MMOL/L (ref 21–32)
CREAT SERPL-MCNC: 0.82 MG/DL (ref 0.6–1.3)
GFR SERPL CREATININE-BSD FRML MDRD: 61 ML/MIN/1.73SQ M
GLUCOSE SERPL-MCNC: 101 MG/DL (ref 65–140)
POTASSIUM SERPL-SCNC: 3.8 MMOL/L (ref 3.5–5.3)
SODIUM SERPL-SCNC: 141 MMOL/L (ref 136–145)

## 2020-01-30 PROCEDURE — 99232 SBSQ HOSP IP/OBS MODERATE 35: CPT | Performed by: INTERNAL MEDICINE

## 2020-01-30 PROCEDURE — 80048 BASIC METABOLIC PNL TOTAL CA: CPT | Performed by: PHYSICIAN ASSISTANT

## 2020-01-30 RX ADMIN — ACETAMINOPHEN 975 MG: 325 TABLET ORAL at 07:16

## 2020-01-30 RX ADMIN — HEPARIN SODIUM 5000 UNITS: 5000 INJECTION INTRAVENOUS; SUBCUTANEOUS at 07:16

## 2020-01-30 RX ADMIN — MELATONIN 3 MG: 3 TAB ORAL at 21:49

## 2020-01-30 RX ADMIN — AMLODIPINE BESYLATE 10 MG: 10 TABLET ORAL at 08:19

## 2020-01-30 RX ADMIN — PANTOPRAZOLE SODIUM 40 MG: 40 TABLET, DELAYED RELEASE ORAL at 07:16

## 2020-01-30 RX ADMIN — METOPROLOL TARTRATE 2.5 MG: 5 INJECTION, SOLUTION INTRAVENOUS at 08:22

## 2020-01-30 RX ADMIN — TRAVOPROST 1 DROP: 0.04 SOLUTION/ DROPS OPHTHALMIC at 21:46

## 2020-01-30 RX ADMIN — MELATONIN 3 MG: 3 TAB ORAL at 00:47

## 2020-01-30 RX ADMIN — LIDOCAINE 1 PATCH: 50 PATCH TOPICAL at 08:19

## 2020-01-30 RX ADMIN — LISINOPRIL 5 MG: 5 TABLET ORAL at 08:22

## 2020-01-30 RX ADMIN — ACETAMINOPHEN 975 MG: 325 TABLET ORAL at 21:48

## 2020-01-30 RX ADMIN — HEPARIN SODIUM 5000 UNITS: 5000 INJECTION INTRAVENOUS; SUBCUTANEOUS at 21:49

## 2020-01-30 NOTE — ASSESSMENT & PLAN NOTE
· Elevated on admission likely secondary to pain  · Continue with home meds of Norvasc and lisinopril  · Pain control  · BP improved initially yesterday but elevated again    Increased amlodipine

## 2020-01-30 NOTE — PROGRESS NOTES
[facility]  Progress Note - Shiela Mcnamara 7/9/1923, 80 y o  female MRN: [id number]    Unit/Bed#: CW2 210-02 Encounter: [id number]    Primary Care Provider: Stefano Roy DO   Date and time admitted to hospital: 1/28/2020  2:04 PM      * Left hip pain  Assessment & Plan  · Patient with acute on chronic left hip pain  worsened after getting recent steroid injection on 1/21/20  · Patient with hip osteoarthritis  · CT scan with Severe degenerative osteoarthritis of the left hip with no acute osseous abnormality  · Orthopedics consult appreciated  No further work-up at this time  Recommend PRN pain control   · Pain controlled with scheduled tylenol   · PT/OT recommendations appreciated - pt refusing rehab, currently trying to arrange for home services/home caregivers     Hypertensive urgency  Assessment & Plan  · POA  Increased amlodipine to 10 mg qd, starting 1/30  · Continue lisinopril 5 mg daily   · PRN IV lopressor  · Monitor   · PT denies headache, visual change, n/v    Hypercalcemia  Assessment & Plan  · Hold calcium supplement  · Monitor    Ambulatory dysfunction  Assessment & Plan  · With fall secondary to left hip pain  · Continue with above treatment  · PT OT eval  · Patient lives alone, she does not want to go to rehab  Interested in more assistance at home  Arranging for this  Essential hypertension  Assessment & Plan  · Elevated on admission likely secondary to pain  · Continue with home meds of Norvasc and lisinopril  · Pain control  · BP improved initially yesterday but elevated again  Increased amlodipine       VTE Pharmacologic Prophylaxis:   Pharmacologic: Heparin  Mechanical VTE Prophylaxis in Place: Yes    Patient Centered Rounds: I have performed bedside rounds with nursing staff today  Discussions with Specialists or Other Care Team Provider: RNBONNIE    Education and Discussions with Family / Patient: patient, nephew Clive Mackey     Time Spent for Care: 20 minutes    More than 50% of total time spent on counseling and coordination of care as described above  Current Length of Stay: 1 day(s)    Current Patient Status: Inpatient   Certification Statement: The patient will continue to require additional inpatient hospital stay due to safe discharge planning, hypertensive urgency    Discharge Plan: safe d/c plan arranging for caregivers at home, pending improvement in BP     Code Status: Level 3 - DNAR and DNI      Subjective:   Patient has no acute complaints  She did not sleep well  She rates hip pain as a /10     Objective:     Vitals:   Temp (24hrs), Av 1 °F (36 7 °C), Min:97 5 °F (36 4 °C), Max:98 4 °F (36 9 °C)    Temp:  [97 5 °F (36 4 °C)-98 4 °F (36 9 °C)] 97 5 °F (36 4 °C)  HR:  [77-97] 94  Resp:  [18-19] 19  BP: (175-190)/() 180/107  SpO2:  [93 %-97 %] 97 %  There is no height or weight on file to calculate BMI  Input and Output Summary (last 24 hours): Intake/Output Summary (Last 24 hours) at 2020 1038  Last data filed at 2020 0826  Gross per 24 hour   Intake 1050 ml   Output 200 ml   Net 850 ml       Physical Exam:     Physical Exam   Constitutional: She is oriented to person, place, and time  No distress  HENT:   Head: Normocephalic and atraumatic  Eyes: EOM are normal  No scleral icterus  Neck: Normal range of motion  Neck supple  Cardiovascular: Normal rate and regular rhythm  Pulmonary/Chest: Effort normal and breath sounds normal  No respiratory distress  She has no wheezes  Abdominal: Soft  Bowel sounds are normal  She exhibits no distension  There is no tenderness  Musculoskeletal: Normal range of motion  Neurological: She is alert and oriented to person, place, and time  Skin: Skin is warm and dry  She is not diaphoretic  Psychiatric: She has a normal mood and affect  Her behavior is normal    Vitals reviewed        Additional Data:     Labs:    Results from last 7 days   Lab Units 20  0516   WBC Thousand/uL 8  39   HEMOGLOBIN g/dL 12 1   HEMATOCRIT % 37 7   PLATELETS Thousands/uL 212   NEUTROS PCT % 60   LYMPHS PCT % 22   MONOS PCT % 14*   EOS PCT % 2     Results from last 7 days   Lab Units 01/30/20  0537  01/28/20  1604   SODIUM mmol/L 141   < > 141   POTASSIUM mmol/L 3 8   < > 4 3   CHLORIDE mmol/L 110*   < > 110*   CO2 mmol/L 26   < > 26   BUN mg/dL 25   < > 34*   CREATININE mg/dL 0 82   < > 1 06   ANION GAP mmol/L 5   < > 5   CALCIUM mg/dL 9 5   < > 10 3*   ALBUMIN g/dL  --   --  2 6*   TOTAL BILIRUBIN mg/dL  --   --  0 69   ALK PHOS U/L  --   --  101   ALT U/L  --   --  18   AST U/L  --   --  14   GLUCOSE RANDOM mg/dL 101   < > 92    < > = values in this interval not displayed  * I Have Reviewed All Lab Data Listed Above  * Additional Pertinent Lab Tests Reviewed: All Labs Within Last 24 Hours Reviewed    Imaging:    Imaging Reports Reviewed Today Include: none  Imaging Personally Reviewed by Myself Includes:  none    Recent Cultures (last 7 days):           Last 24 Hours Medication List:     Current Facility-Administered Medications:  acetaminophen 975 mg Oral Q8H [facility], DO   amLODIPine 10 mg Oral Daily Tita Galaviz PA-C   heparin (porcine) 5,000 Units Subcutaneous [facility], DO   lidocaine 1 patch Topical Daily [facility], DO   lisinopril 5 mg Oral Daily [facility], DO   melatonin 3 mg Oral HS Ledy Dockery PA-C   metoprolol 2 5 mg Intravenous Q6H PRN [facility], DO   ondansetron 4 mg Intravenous Q6H PRN [facility], DO   oxyCODONE 5 mg Oral Q4H PRN [facility], DO   pantoprazole 40 mg Oral Early Morning [facility], DO   travoprost 1 drop Both Eyes HS [facility], DO        Today, Patient Was Seen By: Moises Melo PA-C    ** Please Note: Dictation voice to text software may have been used in the creation of this document   **

## 2020-01-30 NOTE — ASSESSMENT & PLAN NOTE
· Patient with acute on chronic left hip pain  worsened after getting recent steroid injection on 1/21/20  · Patient with hip osteoarthritis  · CT scan with Severe degenerative osteoarthritis of the left hip with no acute osseous abnormality  · Orthopedics consult appreciated  No further work-up at this time    Recommend PRN pain control   · Pain controlled with scheduled tylenol   · PT/OT recommendations appreciated - pt refusing rehab, currently trying to arrange for home services/home caregivers

## 2020-01-30 NOTE — SOCIAL WORK
BONNIE spoke with Srikanth fregoso who reported that [facility]  CM let nephew know that pt would also need assist during the daytime bc she is assist X 1  Nephew stated that daytime assist can also be arranged with [facility] and he is meeting with pt's  soon  Nephmona is deciding between [place] and [facility] care  He will call this CM with his final decision  3:20pm CM spoke with Jamila fregoso and informed him that physician anticipates pt will be released tomorrow   Nephew chose [facility] and CM made referral

## 2020-01-30 NOTE — ASSESSMENT & PLAN NOTE
· POA    Increased amlodipine to 10 mg qd, starting 1/30  · Continue lisinopril 5 mg daily   · PRN IV lopressor  · Monitor   · PT denies headache, visual change, n/v

## 2020-01-31 PROCEDURE — 97530 THERAPEUTIC ACTIVITIES: CPT

## 2020-01-31 PROCEDURE — 99232 SBSQ HOSP IP/OBS MODERATE 35: CPT | Performed by: INTERNAL MEDICINE

## 2020-01-31 PROCEDURE — 97116 GAIT TRAINING THERAPY: CPT

## 2020-01-31 PROCEDURE — 97535 SELF CARE MNGMENT TRAINING: CPT

## 2020-01-31 RX ADMIN — TRAVOPROST 1 DROP: 0.04 SOLUTION/ DROPS OPHTHALMIC at 22:12

## 2020-01-31 RX ADMIN — ACETAMINOPHEN 975 MG: 325 TABLET ORAL at 06:46

## 2020-01-31 RX ADMIN — OXYCODONE HYDROCHLORIDE 5 MG: 5 TABLET ORAL at 22:15

## 2020-01-31 RX ADMIN — PANTOPRAZOLE SODIUM 40 MG: 40 TABLET, DELAYED RELEASE ORAL at 06:47

## 2020-01-31 RX ADMIN — AMLODIPINE BESYLATE 10 MG: 10 TABLET ORAL at 08:22

## 2020-01-31 RX ADMIN — HEPARIN SODIUM 5000 UNITS: 5000 INJECTION INTRAVENOUS; SUBCUTANEOUS at 22:04

## 2020-01-31 RX ADMIN — HEPARIN SODIUM 5000 UNITS: 5000 INJECTION INTRAVENOUS; SUBCUTANEOUS at 13:55

## 2020-01-31 RX ADMIN — LISINOPRIL 5 MG: 5 TABLET ORAL at 08:22

## 2020-01-31 RX ADMIN — MELATONIN 3 MG: 3 TAB ORAL at 22:05

## 2020-01-31 RX ADMIN — HEPARIN SODIUM 5000 UNITS: 5000 INJECTION INTRAVENOUS; SUBCUTANEOUS at 06:46

## 2020-01-31 RX ADMIN — ACETAMINOPHEN 975 MG: 325 TABLET ORAL at 22:05

## 2020-01-31 RX ADMIN — ACETAMINOPHEN 975 MG: 325 TABLET ORAL at 13:54

## 2020-01-31 NOTE — PROGRESS NOTES
[address] [facility]  Progress Note - Federico Palma 7/9/1923, 80 y o  female MRN: [id number]    Unit/Bed#: CW2 210-02 Encounter: [id number]    Primary Care Provider: Luis Manuel Gann DO   Date and time admitted to hospital: 1/28/2020  2:04 PM      * Left hip pain  Assessment & Plan  · Patient with acute on chronic left hip pain  worsened after getting recent steroid injection on 1/21/20  · Patient with hip osteoarthritis  · CT scan with Severe degenerative osteoarthritis of the left hip with no acute osseous abnormality  · Orthopedics consult appreciated  No further work-up at this time  Recommend PRN pain control   · Pain controlled with scheduled tylenol   · PT/OT recommendations appreciated - pt refused rehab and nephew initially agreed and was arranging for home care givers  However, at this time nephew is agreeable to rehab and has provided choices  Referrals made CM following     Hypertensive urgency  Assessment & Plan  · POA  Increased amlodipine to 10 mg qd, starting 1/30  · Continue lisinopril 5 mg daily   · PRN IV lopressor  · Monitor   · PT denies headache, visual change, n/v  · BP now improved    Hypercalcemia  Assessment & Plan  · Hold calcium supplement  · Monitor    Ambulatory dysfunction  Assessment & Plan  · With fall secondary to left hip pain  · Continue with above treatment  · PT OT eval  · Patient lives alone, recommended for rehab, nephew has given choices to CM and referral made     Essential hypertension  Assessment & Plan  · Elevated on admission likely secondary to pain  · Continue with home meds of Norvasc and lisinopril  · Pain control  · Amlodipine increased  · BP improved       VTE Pharmacologic Prophylaxis:   Pharmacologic: Heparin  Mechanical VTE Prophylaxis in Place: Yes    Patient Centered Rounds: I have performed bedside rounds with nursing staff today      Discussions with Specialists or Other Care Team Provider: RNBONNIE    Education and Discussions with Family / Patient: patient    Time Spent for Care: 15 minutes  More than 50% of total time spent on counseling and coordination of care as described above  Current Length of Stay: 2 day(s)    Current Patient Status: Inpatient   Certification Statement: The patient will continue to require additional inpatient hospital stay due to safe d/c plan needs placement     Discharge Plan: needs placement    Code Status: Level 3 - DNAR and DNI      Subjective:   Patient reports her hip feels much better  She is happy to be up in the chair today  Objective:     Vitals:   Temp (24hrs), Av 7 °F (37 1 °C), Min:98 °F (36 7 °C), Max:99 3 °F (37 4 °C)    Temp:  [98 °F (36 7 °C)-99 3 °F (37 4 °C)] 99 3 °F (37 4 °C)  HR:  [] 108  Resp:  [18] 18  BP: (155-176)/(82-96) 164/82  SpO2:  [95 %-99 %] 97 %  There is no height or weight on file to calculate BMI  Input and Output Summary (last 24 hours): Intake/Output Summary (Last 24 hours) at 2020 1111  Last data filed at 2020 0700  Gross per 24 hour   Intake 780 ml   Output 400 ml   Net 380 ml       Physical Exam:     Physical Exam   Constitutional: She is oriented to person, place, and time  No distress  HENT:   Head: Normocephalic and atraumatic  Eyes: EOM are normal  No scleral icterus  Neck: Normal range of motion  Neck supple  Cardiovascular: Normal rate and regular rhythm  Pulmonary/Chest: Effort normal and breath sounds normal  No respiratory distress  Abdominal: Soft  Bowel sounds are normal  She exhibits no distension  There is no tenderness  Musculoskeletal: Normal range of motion  Neurological: She is alert and oriented to person, place, and time  No cranial nerve deficit  Skin: Skin is warm and dry  She is not diaphoretic  Psychiatric: She has a normal mood and affect  Her behavior is normal    Vitals reviewed        Additional Data:     Labs:    Results from last 7 days   Lab Units 20  0516   WBC Thousand/uL 8 39   HEMOGLOBIN g/dL 12 1   HEMATOCRIT % 37 7   PLATELETS Thousands/uL 212   NEUTROS PCT % 60   LYMPHS PCT % 22   MONOS PCT % 14*   EOS PCT % 2     Results from last 7 days   Lab Units 01/30/20  0537  01/28/20  1604   SODIUM mmol/L 141   < > 141   POTASSIUM mmol/L 3 8   < > 4 3   CHLORIDE mmol/L 110*   < > 110*   CO2 mmol/L 26   < > 26   BUN mg/dL 25   < > 34*   CREATININE mg/dL 0 82   < > 1 06   ANION GAP mmol/L 5   < > 5   CALCIUM mg/dL 9 5   < > 10 3*   ALBUMIN g/dL  --   --  2 6*   TOTAL BILIRUBIN mg/dL  --   --  0 69   ALK PHOS U/L  --   --  101   ALT U/L  --   --  18   AST U/L  --   --  14   GLUCOSE RANDOM mg/dL 101   < > 92    < > = values in this interval not displayed  * I Have Reviewed All Lab Data Listed Above  * Additional Pertinent Lab Tests Reviewed: All Labs Within Last 24 Hours Reviewed    Imaging:    Imaging Reports Reviewed Today Include: none  Imaging Personally Reviewed by Myself Includes:  none    Recent Cultures (last 7 days):           Last 24 Hours Medication List:     Current Facility-Administered Medications:  acetaminophen 975 mg Oral Q8H [address] [facility] [place], DO   amLODIPine 10 mg Oral Daily Tita Galaviz PA-C   heparin (porcine) 5,000 Units Subcutaneous [facility], DO   lidocaine 1 patch Topical Daily [facility], DO   lisinopril 5 mg Oral Daily [facility], DO   melatonin 3 mg Oral HS Ledy Dockery PA-C   metoprolol 2 5 mg Intravenous Q6H PRN [facility], DO   ondansetron 4 mg Intravenous Q6H PRN [facility], DO   oxyCODONE 5 mg Oral Q4H PRN [facility], DO   pantoprazole 40 mg Oral Early Morning [facility], DO   travoprost 1 drop Both Eyes HS [facility], DO        Today, Patient Was Seen By: Pastor Alexandra PA-C    ** Please Note: Dictation voice to text software may have been used in the creation of this document   **

## 2020-01-31 NOTE — PHYSICAL THERAPY NOTE
Physical Therapy Treatment Note     01/31/20 1024   Pain Assessment   Pain Assessment No/denies pain   Restrictions/Precautions   Weight Bearing Precautions Per Order No   Other Precautions Chair Alarm; Fall Risk;Telemetry;Hard of hearing   General   Chart Reviewed Yes   Family/Caregiver Present No   Cognition   Overall Cognitive Status WFL   Subjective   Subjective Pt  agreeable to PT  Reported no pain in L hip  Pt  seated on recliner upon entry  Transfers   Sit to Stand 5  Supervision   Additional items Assist x 1; Increased time required;Armrests   Stand to Sit 5  Supervision   Additional items Assist x 1; Increased time required;Armrests   Stand pivot 5  Supervision   Additional items Assist x 1; Increased time required  (with RW)   Toilet transfer 4  Minimal assistance   Additional items Assist x 1;Standard toilet;Verbal cues; Increased time required  (use of grab bars)   Ambulation/Elevation   Gait pattern Excessively slow;Decreased foot clearance; Improper Weight shift;Decreased L stance  (L foot ER)   Gait Assistance 5  Supervision   Additional items Assist x 1;Verbal cues   Assistive Device Rolling walker   Distance 330ft   Stair Management Assistance 5  Supervision   Additional items Assist x 1;Verbal cues; Increased time required   Stair Management Technique Two rails; Step to pattern; Foreward;Backward   Number of Stairs 1   Balance   Static Sitting Good   Ambulatory Fair -   Endurance Deficit   Endurance Deficit No   Activity Tolerance   Activity Tolerance Patient tolerated treatment well   Nurse Made Aware Yes   Assessment   Prognosis Good   Problem List Decreased strength;Decreased range of motion;Decreased mobility; Decreased safety awareness; Impaired hearing   Assessment Pt  noted with good progression in her mobility  Physical assist given only for transfer from sit to stand from toilet  Pt  able to perform toileting and higeine with S  Not gait to be slow howvever not antalgic   Pt  reported her L hip does not hurt during amb  but wishes it would stop making the noise  Will continue to follow durign the stay to maximize functional mobility  Barriers to Discharge Decreased caregiver support   Goals   Patient Goals None rported   STG Expiration Date 02/08/20   PT Treatment Day 1   Plan   Treatment/Interventions Functional transfer training;Elevations; Equipment eval/education;Patient/family training;Gait training;Spoke to nursing   Progress Progressing toward goals   PT Frequency Other (Comment)  (3-5x/week)   Recommendation   Recommendation Post acute IP rehab   Equipment Recommended Walker   PT - OK to Discharge Yes         Poppy Stoddard, PTA

## 2020-01-31 NOTE — ASSESSMENT & PLAN NOTE
· Elevated on admission likely secondary to pain  · Continue with home meds of Norvasc and lisinopril  · Pain control  · Amlodipine increased  · BP improved

## 2020-01-31 NOTE — ASSESSMENT & PLAN NOTE
· With fall secondary to left hip pain  · Continue with above treatment  · PT OT eval  · Patient lives alone, recommended for rehab, nephew has given choices to CM and referral made

## 2020-01-31 NOTE — ASSESSMENT & PLAN NOTE
· Patient with acute on chronic left hip pain  worsened after getting recent steroid injection on 1/21/20  · Patient with hip osteoarthritis  · CT scan with Severe degenerative osteoarthritis of the left hip with no acute osseous abnormality  · Orthopedics consult appreciated  No further work-up at this time  Recommend PRN pain control   · Pain controlled with scheduled tylenol   · PT/OT recommendations appreciated - pt refused rehab and nephew initially agreed and was arranging for home care givers  However, at this time nephew is agreeable to rehab and has provided choices    Referrals made CM following

## 2020-01-31 NOTE — OCCUPATIONAL THERAPY NOTE
[address]  Treatment Note     Patient Name: Inna Dean  VZWKV'[id number] Date: 1/31/2020  Problem List  Principal Problem:    Left hip pain  Active Problems:    Essential hypertension    Ambulatory dysfunction    Hypercalcemia    Hypertensive urgency          01/31/20 0856   Restrictions/Precautions   Weight Bearing Precautions Per Order No   Braces or Orthoses   (none)   Other Precautions Cognitive; Chair Alarm;Telemetry; Fall Risk;Hard of hearing   Pain Assessment   Pain Assessment No/denies pain   Pain Score No Pain   ADL   Eating Assistance 5  Supervision/Setup  (setup w/ breakfast tray this morning)   Grooming Assistance 4  Minimal Assistance  (w/ fatigue standing w/ RW support)   LB Dressing Comments seated EOB - tailor sit don R sock w/ S; max A w/ donning L sock  states she has a sock aide at home  Toileting Assistance  3  Moderate Assistance   Toileting Comments for thoroughness s/p BM  increased time to complete  Functional Standing Tolerance   Time 5 mins   Activity ADLs   Comments standing at sink w/ RW support   Bed Mobility   Supine to Sit 3  Moderate assistance   Additional items Assist x 1;HOB elevated; Bedrails; Increased time required;Verbal cues;LE management   Transfers   Additional Comments transfers w/ RW  increased time, cues for safety/hand placement  monitor BPs  elevated at 191/116 on Sriram seated EOB, manual 176/94  impulsive at times  Functional Mobility   Functional Mobility 4  Minimal assistance   Additional items Rolling walker   Toilet Transfers   Toilet Transfer From Rolling walker   Toilet Transfer Type To and from   Toilet Transfer to Raised toilet seat with rails   Toilet Transfer Technique Ambulating   Toilet Transfers Minimal assistance   Toilet Transfers Comments states she just got grab bars around her toilet for dioni   impulsive, attempted to stand from toilet w/o assist, did not ring for assist despite OT educated patient on ringing for assistance when completing toilet  requires cues for carryover  patient verbalized understanding however would benefit from continued education to maximize carryover and decrease risk of falls/injury   Cognition   Overall Cognitive Status Impaired   Arousal/Participation Responsive; Alert; Cooperative   Attention Attends with cues to redirect   Orientation Level Oriented X4   Memory Decreased recall of precautions;Decreased recall of recent events   Following Commands Follows one step commands with increased time or repetition   Comments decreased safety awareness/insight/judgment  cues for sequencing and safety - see assessment  Activity Tolerance   Activity Tolerance Patient limited by fatigue   Medical Staff Made Aware OK to see per RN Lawence Hidden   Assessment   Assessment Patient participated in Skilled OT session this date with interventions consisting of ADL re training with the use of correct body mechnaics, Energy Conservation techniques, deep breathing technique, safety awareness and fall prevention techniques, increase standing tolerance time with unilateral UE support to complete sink level ADLs, increase cardiovascular endurance , elicit righting and equilibrium reactions for improved postural control and alignment during transitional movements, increase dynamic sit/ stand balance during functional activity , increase postural control, increase trunk control and increase OOB/ sitting tolerance   Patient agreeable to OT treatment session, upon arrival patient was found supine in bed  In comparison to previous session, patient with improvements in activity tolerance, functional transfers/ambulation, strengthening, balance and ADL status  Patient requiring frequent re direction, verbal cues for safety, cognitive assistance to anticipate next step and one step directives  Completed bed mobility and required increased encouragement to don socks (stating "can't you do it?")  Says she has a sock aide at home   Was able to don R sock seated EOB using tailor sit method w/ S and required A to don sock on LLE  Patient requesting to use bathroom - completed toileting w/ mod A for thoroughness s/p bowel movement w/ increased time, and multiple functional transfers from varying surfaces w/ min A  Impulsive at times and seen attempting to stand from toilet alone, educated on ringing for assistance  Completed grooming standing at sink with S initially, progressed to cga/min A w/ fatigue  Tolerated standing at sink for 5 mins  Patient also required cues for safety, started to back away from sink and turning away from RW after finishing grooming tasks - requires cues for safety with hand placement and RW management  Also required cues to turn off faucet and patient attempted to reach out of base of support to turn sink off, cues to correct to decrease risk of falls/injury  Would benefit from continued education on safety while in hospital   Seated in bedside chair, LEs elevated, SCD pumps on and chair alarm on at end of session  Patient continued to state throughout session "I will do better with all this when I get home"  Patient continues to be functioning below baseline level, occupational performance remains limited secondary to factors listed above and increased risk for falls and injury  From OT standpoint, recommendation at time of DC would be STR  Patient is refusing rehab and wants to go home  If patient is to go home at discharge, recommend 24/7 S/A in place and home OT services as patient continues to require Ax1 for ADLs, functional transfers/mobility w/ RW for safety     Plan   Goal Expiration Date 02/08/20   OT Treatment Day 1   OT Frequency 3-5x/wk   Recommendation   OT Discharge Recommendation Short Term Rehab  (refusing rehab - if home, rec 24/7 S/A and home OT)   OT - OK to Discharge Yes  (if to rehab when medically appropriate)         Jennifer Fuchs MS, OTR/L

## 2020-01-31 NOTE — SOCIAL WORK
Nephew left message for CM that he spoke with several people at pt's apartment and he has concerns about pt returning to her apt  Nephew stated that pt is using a fold up chair in her shower and not taking medication as prescribed  A post acute care recommendation was made by the care team for inpt short term rehab  Discussed [place] of Choice with nephew  List of resources was given to nephew via   email in [place]  Pt/family aware the list is custom filtered for them by [facility] and that [facility] post acute providers are designated  Nephew chose [facility] and   CM made referrals and updated SLIM      12:30pm CM met with pt and nephew, Tony James  Provided pt with list of SNF choices via [place]  Pt agreeable to rehab and decided to add [facility] to the referrals  CM completed PASRR and sent to SNFs in [place]  Added [facility] to the ref[facility]  Pt and nephew are aware that [facility] is interested in pt      4:23pm CM updated nephew that [facility] is ready to admit pt tomorrow 2/1/20 and CM doesn't have a confirmation from [facility] yet  Nephew and pt chose [facility]  Nephew will  pt at 10am   CM notified physician, pt's nurse and [facility]  Entered nursing report and fax into Facility Contacts   [facility] would like any narcotic scripts faxed prior to pt leaving the hospital

## 2020-01-31 NOTE — PLAN OF CARE
Problem: PHYSICAL THERAPY ADULT  Goal: Performs mobility at highest level of function for planned discharge setting  See evaluation for individualized goals  Description  Treatment/Interventions: LE strengthening/ROM, Functional transfer training, Therapeutic exercise, Endurance training, Bed mobility, Gait training, Patient/family training          See flowsheet documentation for full assessment, interventions and recommendations  Outcome: Progressing  Note:   Prognosis: Good  Problem List: Decreased strength, Decreased range of motion, Decreased mobility, Decreased safety awareness, Impaired hearing  Assessment: Pt  noted with good progression in her mobility  Physical assist given only for transfer from sit to stand from toilet  Pt  able to perform toileting and higeine with S  Not gait to be slow howvever not antalgic  Pt  reported her L hip does not hurt during amb  but wishes it would stop making the noise  Will continue to follow durign the stay to maximize functional mobility  Barriers to Discharge: Decreased caregiver support     Recommendation: Post acute IP rehab     PT - OK to Discharge: Yes    See flowsheet documentation for full assessment

## 2020-01-31 NOTE — ASSESSMENT & PLAN NOTE
· POA    Increased amlodipine to 10 mg qd, starting 1/30  · Continue lisinopril 5 mg daily   · PRN IV lopressor  · Monitor   · PT denies headache, visual change, n/v  · BP now improved

## 2020-01-31 NOTE — PLAN OF CARE
Problem: OCCUPATIONAL THERAPY ADULT  Goal: Performs self-care activities at highest level of function for planned discharge setting  See evaluation for individualized goals  Description  Treatment Interventions: ADL retraining, Functional transfer training, Endurance training, Patient/family training, Equipment evaluation/education, Compensatory technique education, Continued evaluation, Energy conservation, Activityengagement          See flowsheet documentation for full assessment, interventions and recommendations  Outcome: Progressing  Note:   Limitation: Decreased ADL status, Decreased endurance, Decreased high-level ADLs, Decreased self-care trans  Prognosis: Good  Assessment: Patient participated in Skilled OT session this date with interventions consisting of ADL re training with the use of correct body mechnaics, Energy Conservation techniques, deep breathing technique, safety awareness and fall prevention techniques, increase standing tolerance time with unilateral UE support to complete sink level ADLs, increase cardiovascular endurance , elicit righting and equilibrium reactions for improved postural control and alignment during transitional movements, increase dynamic sit/ stand balance during functional activity , increase postural control, increase trunk control and increase OOB/ sitting tolerance   Patient agreeable to OT treatment session, upon arrival patient was found supine in bed  In comparison to previous session, patient with improvements in activity tolerance, functional transfers/ambulation, strengthening, balance and ADL status  Patient requiring frequent re direction, verbal cues for safety, cognitive assistance to anticipate next step and one step directives  Completed bed mobility and required increased encouragement to don socks (stating "can't you do it?")  Says she has a sock aide at home   Was able to don R sock seated EOB using tailor sit method w/ S and required A to don sock on LLE  Patient requesting to use bathroom - completed toileting w/ mod A for thoroughness s/p bowel movement w/ increased time, and multiple functional transfers from varying surfaces w/ min A  Impulsive at times and seen attempting to stand from toilet alone, educated on ringing for assistance  Completed grooming standing at sink with S initially, progressed to cga/min A w/ fatigue  Tolerated standing at sink for 5 mins  Patient also required cues for safety, started to back away from sink and turning away from RW after finishing grooming tasks - requires cues for safety with hand placement and RW management  Also required cues to turn off faucet and patient attempted to reach out of base of support to turn sink off, cues to correct to decrease risk of falls/injury  Would benefit from continued education on safety while in hospital   Seated in bedside chair, LEs elevated, SCD pumps on and chair alarm on at end of session  Patient continued to state throughout session "I will do better with all this when I get home"  Patient continues to be functioning below baseline level, occupational performance remains limited secondary to factors listed above and increased risk for falls and injury  From OT standpoint, recommendation at time of discharge would be STR  Patient refusing rehab - if home, recommend 24/7 S/A and home OT as patient continues to require Ax1 for ADLs and functional transfers/mobility and safety w/ all tasks       OT Discharge Recommendation: Short Term Rehab(refusing rehab - if home, rec 24/7 S/A and home OT)  OT - OK to Discharge: Yes(if to rehab when medically appropriate)

## 2020-02-01 VITALS
OXYGEN SATURATION: 97 % | BODY MASS INDEX: 32.44 KG/M2 | HEIGHT: 59 IN | SYSTOLIC BLOOD PRESSURE: 146 MMHG | WEIGHT: 160.94 LBS | RESPIRATION RATE: 18 BRPM | HEART RATE: 91 BPM | TEMPERATURE: 98.2 F | DIASTOLIC BLOOD PRESSURE: 78 MMHG

## 2020-02-01 PROBLEM — I16.0 HYPERTENSIVE URGENCY: Status: RESOLVED | Noted: 2020-01-30 | Resolved: 2020-02-01

## 2020-02-01 PROCEDURE — 99239 HOSP IP/OBS DSCHRG MGMT >30: CPT | Performed by: INTERNAL MEDICINE

## 2020-02-01 RX ORDER — OXYCODONE HYDROCHLORIDE 5 MG/1
2.5 TABLET ORAL EVERY 4 HOURS PRN
Qty: 3 TABLET | Refills: 0 | Status: SHIPPED | OUTPATIENT
Start: 2020-02-01 | End: 2020-02-06

## 2020-02-01 RX ORDER — LIDOCAINE 50 MG/G
2 PATCH TOPICAL DAILY
Qty: 60 PATCH | Refills: 0
Start: 2020-02-02 | End: 2020-03-03

## 2020-02-01 RX ORDER — AMLODIPINE BESYLATE 10 MG/1
10 TABLET ORAL DAILY
Qty: 30 TABLET | Refills: 0
Start: 2020-02-02

## 2020-02-01 RX ADMIN — LIDOCAINE 1 PATCH: 50 PATCH TOPICAL at 08:22

## 2020-02-01 RX ADMIN — AMLODIPINE BESYLATE 10 MG: 10 TABLET ORAL at 08:22

## 2020-02-01 RX ADMIN — LISINOPRIL 5 MG: 5 TABLET ORAL at 08:22

## 2020-02-01 RX ADMIN — PANTOPRAZOLE SODIUM 40 MG: 40 TABLET, DELAYED RELEASE ORAL at 06:20

## 2020-02-01 RX ADMIN — HEPARIN SODIUM 5000 UNITS: 5000 INJECTION INTRAVENOUS; SUBCUTANEOUS at 06:20

## 2020-02-01 RX ADMIN — ACETAMINOPHEN 975 MG: 325 TABLET ORAL at 06:20

## 2020-02-01 NOTE — DISCHARGE SUMMARY
Discharge Summary - [address] [facility]    Patient Information: Shiela Mcnamara 80 y o  female MRN: [id number]  Unit/Bed#: CW2 210-02 Encounter: [id number]    Discharging Physician / Practitioner: Juancarlos Olivo MD  PCP: Stefano Roy DO  Admission Date: 1/28/2020  Discharge Date: 02/01/20    Disposition:     Other: SNF    Reason for Admission:  Left hip pain, ambulatory dysfunction    Discharge Diagnoses:     Principal Problem:    Left hip pain  Active Problems:    Essential hypertension    Ambulatory dysfunction    Hypercalcemia  Resolved Problems:    Hypertensive urgency      Consultations During Hospital Stay:  · Orthopedics    Procedures Performed:     · CT head no acute intracranial abnormality  · CT cervical spine no cervical spine fracture or traumatic malalignment  · CT left lower extremity severe degenerative osteoarthritis left hip  · Chest x-ray no active lung disease      Hospital Course:     Shiela Mcnamara is a 80 y o  female patient who originally presented to the hospital on 1/28/2020 due to left hip pain, ambulatory dysfunction, multiple falls  Patient with known history of severe osteoarthritis of the hips follows with orthopedics for intra-articular steroid injections, presented with severe left hip pain ambulatory dysfunction multiple falls  Patient was evaluated by Orthopedics no further interventions are planned  She was recently evaluated by Orthopedics as outpatient and received steroid injection with little improvement  Patient was will be physical therapy provided with analgesics, she is recommended SNF for rehabilitation  She reports pain is better with analgesics, able to sit up in a bed pain is worsened on movement sometimes on bearing weight  She is hemodynamically stable symptomatically improved since admission and is deemed ready for discharge today  Kindly review the chart for details      Condition at Discharge: fair     Discharge Day Visit / Exam: Subjective:      Comfortably sitting up in bed  Reports feeling okay  Agreeable to discharge plan    Vitals: Blood Pressure: 146/78 (02/01/20 0649)  Pulse: 91 (02/01/20 0649)  Temperature: 98 2 °F (36 8 °C) (02/01/20 0649)  Temp Source: Oral (02/01/20 0649)  Respirations: 18 (02/01/20 0649)  Height: 4' 11" (149 9 cm) (01/31/20 1440)  Weight - Scale: 73 kg (160 lb 15 oz) (01/31/20 1440)  SpO2: 97 % (02/01/20 0649)  Exam:   Physical Exam    Sitting up in bed  Neck supple  Lungs diminished breath sounds bilaterally  Heart sounds S1 and S2 noted  Abdomen soft  Awake obeys simple commands  Pulses noted  No rash    Discharge instructions/Information to patient and family:   See after visit summary for information provided to patient and family  Discharge plan discussed the patient, family at bedside discussed in detail  Outpatient follow-up with primary care physician, Orthopedics    Provisions for Follow-Up Care:  See after visit summary for information related to follow-up care and any pertinent home health orders  Planned Readmission: no     Discharge Statement:  I spent 45 minutes discharging the patient  This time was spent on the day of discharge  I had direct contact with the patient on the day of discharge  Greater than 50% of the total time was spent examining patient, answering all patient questions, arranging and discussing plan of care with patient as well as directly providing post-discharge instructions  Additional time then spent on discharge activities  Discharge Medications:  See after visit summary for reconciled discharge medications provided to patient and family        ** Please Note: This note has been constructed using a voice recognition system **

## 2020-02-14 ENCOUNTER — OFFICE VISIT (OUTPATIENT)
Dept: OBGYN CLINIC | Facility: MEDICAL CENTER | Age: 85
End: 2020-02-14
Payer: MEDICARE

## 2020-02-14 VITALS — DIASTOLIC BLOOD PRESSURE: 97 MMHG | HEART RATE: 91 BPM | SYSTOLIC BLOOD PRESSURE: 153 MMHG

## 2020-02-14 DIAGNOSIS — M16.12 PRIMARY OSTEOARTHRITIS OF LEFT HIP: Primary | ICD-10-CM

## 2020-02-14 PROCEDURE — 99213 OFFICE O/P EST LOW 20 MIN: CPT | Performed by: ORTHOPAEDIC SURGERY

## 2020-02-14 NOTE — PROGRESS NOTES
Assessment:  1  Primary osteoarthritis of left hip         Plan:  The patient can use Tylenol PM for pain relief at night  Follow up as needed  To do next visit:  Return if symptoms worsen or fail to improve  The above stated was discussed in layman's terms and the patient expressed understanding  All questions were answered to the patient's satisfaction  Scribe Attestation    I,:   Malina Khalil am acting as a scribe while in the presence of the attending physician :        I,:   Lynda Carter MD personally performed the services described in this documentation    as scribed in my presence :              Subjective:   Oumou Sheets is a 80 y o  female who presents for follow up of left hip  She is s/p bilateral trochanteric bursa steroid injections with benefit, 1/10/2020  Today she complains of severe left groin pain  Sitting alleviates  Weight bearing aggravates  She does use meloxicam, tylenol and oxycodone with benefit  She has had IA hip injections with limited benefit  She does use a walker for ambulation          Review of systems negative unless otherwise specified in HPI    Past Medical History:   Diagnosis Date    Hypertension        Past Surgical History:   Procedure Laterality Date    FL INJECTION LEFT HIP (NON ARTHROGRAM)  9/11/2018    FL INJECTION LEFT HIP (NON ARTHROGRAM)  12/21/2018    FL INJECTION LEFT HIP (NON ARTHROGRAM)  3/12/2019    FL INJECTION LEFT HIP (NON ARTHROGRAM)  7/23/2019    FL INJECTION LEFT HIP (NON ARTHROGRAM)  10/16/2019    FL INJECTION LEFT HIP (NON ARTHROGRAM)  1/21/2020    HYSTERECTOMY         Family History   Problem Relation Age of Onset    Stroke Mother     Cancer Father        Social History     Occupational History    Not on file   Tobacco Use    Smoking status: Never Smoker    Smokeless tobacco: Never Used   Substance and Sexual Activity    Alcohol use: Never     Frequency: Never     Drinks per session: Patient refused     Binge frequency: Never    Drug use: No    Sexual activity: Never         Current Outpatient Medications:     acetaminophen (TYLENOL) 325 mg tablet, Take 3 tablets (975 mg total) by mouth every 8 (eight) hours, Disp: 30 tablet, Rfl: 0    amLODIPine (NORVASC) 10 mg tablet, Take 1 tablet (10 mg total) by mouth daily, Disp: 30 tablet, Rfl: 0    Glucos-Chond-Sterol-Fish Oil (GLUCOSAMINE CHONDROITIN PLUS) CAPS, Take 1 capsule by mouth daily, Disp: , Rfl:     lidocaine (LIDODERM) 5 %, Apply 2 patches topically daily Remove & Discard patch within 12 hours or as directed by MD, Disp: 60 patch, Rfl: 0    lisinopril (ZESTRIL) 5 mg tablet, Take 5 mg by mouth daily, Disp: , Rfl: 3    omeprazole (PriLOSEC) 20 mg delayed release capsule, Take 20 mg by mouth daily, Disp: , Rfl: 3    TRAVATAN Z 0 004 % ophthalmic solution, PUT 1 DROP INTO BOTH EYES AT BEDTIME, Disp: , Rfl: 1    Allergies   Allergen Reactions    Ampicillin             Vitals:    02/14/20 1126   BP: 153/97   Pulse: 91       Objective:  Physical exam  · General: Awake, Alert, Oriented  · Eyes: Pupils equal, round and reactive to light  · Heart: regular rate and rhythm  · Lungs: No audible wheezing  · Abdomen: soft                    Ortho Exam   Left hip:  Minimal TTP trochanteric bursa  Apprehension with ROM  Groin pain with IR  Patient sits comfortably in chair with hip flexed at 90 degrees  Calf compartments soft and supple  Sensation intact  Toes are warm sensate and mobile      Diagnostics, reviewed and taken today if performed as documented: The attending physician has personally reviewed the pertinent films in PACS and interpretation is as follows:  Left hip x-ray:  Severe arthritis  Procedures, if performed today:    Procedures    None performed      Portions of the record may have been created with voice recognition software    Occasional wrong word or "sound a like" substitutions may have occurred due to the inherent limitations of voice recognition software  Read the chart carefully and recognize, using context, where substitutions have occurred

## 2021-01-12 NOTE — ED NOTES
LVM to notify pt    Patient transported to [address], [address] [place] [address]  01/28/20 5474

## 2021-04-30 NOTE — UTILIZATION REVIEW
Initial Clinical Review    Admission: Date/Time/Statement:   Orders Placed This Encounter   Procedures    Place in Observation     Standing Status:   Standing     Number of Occurrences:   1     Order Specific Question:   Admitting Physician     Answer:   Thea Pittman [[id number]]     Order Specific Question:   Level of Care     Answer:   Med Surg [[id number]]     ED Arrival Information     Expected Arrival Acuity Means of Arrival Escorted By Service Admission Type    - 1/25/2020 11:02 Urgent Wheelchair Family Member Hospitalist Urgent    Arrival Complaint    hip pain        Chief Complaint   Patient presents with    Hip Pain     Pt is having L hip pain  Pt had an injection in it on Tuesday     Assessment/Plan: 80 y o  female who presents with acute on chronic left hip pain x 5 days after she got her a steroid injection  Patient has known history of severe osteoarthritis of the hip, follows with orthopedics patient in gets steroid injections  Her previous injections help with her pain significantly but the last injection that she got on 1/21/2020 made her pain worse  It has been progressed to a point that patient was not able to put any weight on her left leg this morning  Denies any other complaints  No fever or chills  No swelling  No fall or trauma  * Left hip pain  Assessment & Plan  Patient with acute on chronic left hip pain  Worsened after getting recent steroid injection  X-ray of the hip done  Official read pending  Patient evaluated by ortho  No concern for septic arthritis at this point even though patient's inflammatory markers elevated  No further imaging needed per ortho  Will start the patient on Tylenol 975 acute weight, lidocaine patch  Continue home meloxicam - change it to prn  Patient also takes Aleve at home  Hold for now  Patient prefers to continue taking meloxicam which she has been taking for many years  Exaplained her the risks of NSAIDS sta her CrCl   Pt's nephew who is a retired EMT understands the risks  Ambulatory dysfunction  Assessment & Plan  Secondary to left hip pain  Patient this morning unable to put any weight or walk around because of severe pain  Pt reports improvement in pain in the ER  Will admit for PT OT melvi     Essential hypertension  Assessment & Plan  Initially elevated on admission most likely because of pain  Improving now that the pain is better  Continue medication amlodipine and lisinopril  VTE Prophylaxis: Enoxaparin (Lovenox)  / sequential compression device   Code Status: DNR DNI  POLST: POLST form is not discussed and not completed at this time  Discussion with family: pt and nephew  Anticipated Length of Stay:  Patient will be admitted on an Observation basis with an anticipated length of stay of  < 2 midnights  Justification for Hospital Stay: above    ORTHOPEDIC CONSULT  80 y  o female complaining of left hip pain  Patient is well known to the orthopedic service, as she follows with Dr Vandana Morse as an outpatient  On 1/21 patient was referred and underwent fluoroscopy guided left intra-articular hip injection  After the procedure she reported mild relief in her chronic hip pain, however starting this morning she began having worsening pain the left hip  Pain is worse with ambulation, but she was able to ambulate utilizing her walker today  No numbness or tingling to the leg, no fevers or chills  Despite elevated inflammatory markers, patient has a benign exam that is not concerning for septic arthritis at this point in time  The worsening degenerative changes compared to x-rays from 2018 could be a sign of avascular necrosis of the left hip or natural progression of her hip osteoarthritis  Plan:   · WBAT left lower extremity  · PT  · Pain control  · Body mass index is 33 65 kg/m²  mildly obese  Recommend behavior modifications, nutrition and physical activity    · Dispo: [id number] [facility]iz for discharge from ortho perspective, should follow up Monday for repeat examination    ED Triage Vitals [01/25/20 1115]   Temperature Pulse Respirations Blood Pressure SpO2   97 6 °F (36 4 °C) 89 20 (!) 202/110 96 %      Temp Source Heart Rate Source Patient Position - Orthostatic VS BP Location FiO2 (%)   Oral Monitor Sitting Left arm --      Pain Score       5        Wt Readings from Last 1 Encounters:   01/25/20 73 kg (161 lb)     Additional Vital Signs:   Pertinent Labs/Diagnostic Test Results:   HIP XRAY  Results from last 7 days   Lab Units 01/26/20  0504 01/25/20  1227   WBC Thousand/uL 12 19* 12 21*   HEMOGLOBIN g/dL 13 5 13 8   HEMATOCRIT % 41 6 44 6   PLATELETS Thousands/uL 194 173   NEUTROS ABS Thousands/µL 7 83* 8 87*     Results from last 7 days   Lab Units 01/26/20  0504 01/25/20  1425   SODIUM mmol/L 145 144   POTASSIUM mmol/L 4 0 4 1   CHLORIDE mmol/L 114* 112*   CO2 mmol/L 24 26   ANION GAP mmol/L 7 6   BUN mg/dL 29* 28*   CREATININE mg/dL 0 99 0 96   EGFR ml/min/1 73sq m 48 50   CALCIUM mg/dL 9 8 10 0     Results from last 7 days   Lab Units 01/25/20  1425   AST U/L 15   ALT U/L 18   ALK PHOS U/L 101   TOTAL PROTEIN g/dL 6 4   ALBUMIN g/dL 2 9*   TOTAL BILIRUBIN mg/dL 0 84     Results from last 7 days   Lab Units 01/26/20  0504 01/25/20  1425   GLUCOSE RANDOM mg/dL 100 96     Results from last 7 days   Lab Units 01/25/20  1342 01/25/20  1228   CRP mg/L >90 0*  --    SED RATE mm/hour  --  53*       ED Treatment:   Medication Administration from 01/25/2020 1102 to 01/25/2020 1611       Date/Time Order Dose Route Action Action by Comments     01/25/2020 1336 acetaminophen (TYLENOL) tablet 975 mg 975 mg Oral Given Pastor Nathan RN         Past Medical History:   Diagnosis Date    Hypertension      Present on Admission:   Left hip pain      Admitting Diagnosis: Elevated C-reactive protein (CRP) [R79 82]  Elevated sed rate [R70 0]  Hip pain [M25 559]  Hip pain, left [M25 552]  Ambulatory dysfunction [R26 2]  Age/Sex: 80 y o  female  Admission Orders:  [place] XRAY  PT OT  Scheduled Medications:    Medications:  acetaminophen 975 mg Oral Q8H [address]   amLODIPine 5 mg Oral Daily   calcium carbonate-vitamin D 1 tablet Oral Daily With Breakfast   enoxaparin 40 mg Subcutaneous Daily   lidocaine 1 patch Topical Daily   lisinopril 5 mg Oral Daily   melatonin 3 mg Oral HS   pantoprazole 40 mg Oral Early Morning   travoprost 1 drop Both Eyes HS     Continuous IV Infusions:     PRN Meds:    hydrALAZINE 5 mg Intravenous Q6H PRN   traMADol 50 mg Oral Q6H PRN       None    Network Utilization Review Department  [email or web address]  ATTENTION: Please call with any questions or concerns to [phone] and carefully listen to the prompts so that you are directed to the right person  All voicemails are confidential   Bruno Schwartz all requests for admission clinical reviews, approved or denied determinations and any other requests to dedicated fax number below belonging to the campus where the patient is receiving treatment   List of dedicated fax numbers for the Facilities:  [address] DENIALS (Administrative/Medical Necessity) [phone]   [address] (Maternity/NICU/Pediatrics) [phone]   Auna Small [phone]   [facility] [place] [phone]   [facility] [phone]   [facility] [phone]   [address] [phone]   [facility]  [phone]   [address] [facility] [address]  [address] [facility] [address] [facility] [phone] not examined

## 2023-06-19 NOTE — ASSESSMENT & PLAN NOTE
Initially elevated on admission most likely because of pain  Improving now that the pain is better  Continue medication amlodipine and lisinopril 
Patient with acute on chronic left hip pain  Worsened after getting recent steroid injection  X-ray of the hip done  Official read pending  Patient evaluated by ortho  No concern for septic arthritis at this point even though patient's inflammatory markers elevated  No further imaging needed per ortho  Will start the patient on Tylenol 975 acute weight, lidocaine patch  Continue home meloxicam   Patient also takes Aleve at home  Hold for now 
Secondary to left hip pain  Patient this morning unable to put any weight or walk around because of severe pain  Pt reports improvement in pain in the ER    Will admit for PT OT melvi
· Initially elevated on admission most likely because of pain  · Improved with additional pain control   · Continue medication amlodipine and lisinopril 
· Patient with acute on chronic left hip pain  Worsened after getting recent steroid injection  · X-ray of the hip shows progressive severe degenerative arthritis of L hip, no acute osseous abnormality  · Patient evaluated by ortho  No concern for septic arthritis at this point even though patient's inflammatory markers elevated  · No further imaging needed per ortho   Recommend discharge with pain medications and f/u with Dr Kyle Stafford as outpatient   · Pain improved this AM  Cleared for discharge back to assisted living facility
· Secondary to left hip pain    · Now improved  · Able to ambulate  · For discharge
I have reviewed and agree with the initial nursing note, except as documented in my note.